# Patient Record
Sex: FEMALE | Race: WHITE | Employment: FULL TIME | URBAN - METROPOLITAN AREA
[De-identification: names, ages, dates, MRNs, and addresses within clinical notes are randomized per-mention and may not be internally consistent; named-entity substitution may affect disease eponyms.]

---
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
## 2020-07-08 PROBLEM — O26.873 SHORT CERVIX, ANTEPARTUM, THIRD TRIMESTER: Status: ACTIVE | Noted: 2020-07-08

## 2020-07-08 PROBLEM — O26.843 UTERINE SIZE DATE DISCREPANCY PREGNANCY, THIRD TRIMESTER: Status: ACTIVE | Noted: 2020-07-08

## 2020-07-28 PROBLEM — Z34.00 PRIMIGRAVIDA, ANTEPARTUM: Status: ACTIVE | Noted: 2020-07-28

## 2020-07-29 ENCOUNTER — HOSPITAL ENCOUNTER (EMERGENCY)
Age: 20
Discharge: HOME | End: 2020-07-29
Attending: OBSTETRICS & GYNECOLOGY | Admitting: OBSTETRICS & GYNECOLOGY
Payer: MEDICAID

## 2020-07-29 VITALS
TEMPERATURE: 98.9 F | HEART RATE: 94 BPM | DIASTOLIC BLOOD PRESSURE: 71 MMHG | RESPIRATION RATE: 18 BRPM | SYSTOLIC BLOOD PRESSURE: 115 MMHG

## 2020-07-29 LAB — FIBRONECTIN FETAL VAG QL: NEGATIVE

## 2020-07-29 PROCEDURE — 74011250636 HC RX REV CODE- 250/636: Performed by: OBSTETRICS & GYNECOLOGY

## 2020-07-29 PROCEDURE — 82731 ASSAY OF FETAL FIBRONECTIN: CPT

## 2020-07-29 PROCEDURE — 59025 FETAL NON-STRESS TEST: CPT

## 2020-07-29 PROCEDURE — 96372 THER/PROPH/DIAG INJ SC/IM: CPT

## 2020-07-29 RX ORDER — TERBUTALINE SULFATE 1 MG/ML
0.25 INJECTION SUBCUTANEOUS
Status: COMPLETED | OUTPATIENT
Start: 2020-07-29 | End: 2020-07-29

## 2020-07-29 RX ADMIN — TERBUTALINE SULFATE 0.25 MG: 1 INJECTION, SOLUTION SUBCUTANEOUS at 13:10

## 2020-07-29 NOTE — DISCHARGE INSTRUCTIONS
Patient Education        Pregnancy Precautions: Care Instructions  Your Care Instructions     There is no sure way to prevent labor before your due date ( labor) or to prevent most other pregnancy problems. But there are things you can do to increase your chances of a healthy pregnancy. Go to your appointments, follow your doctor's advice, and take good care of yourself. Eat well, and exercise (if your doctor agrees). And make sure to drink plenty of water. Follow-up care is a key part of your treatment and safety. Be sure to make and go to all appointments, and call your doctor if you are having problems. It's also a good idea to know your test results and keep a list of the medicines you take. How can you care for yourself at home? · Make sure you go to your prenatal appointments. At each visit, your doctor will check your blood pressure. Your doctor will also check to see if you have protein in your urine. High blood pressure and protein in urine are signs of preeclampsia. This condition can be dangerous for you and your baby. · Drink plenty of fluids, enough so that your urine is light yellow or clear like water. Dehydration can cause contractions. If you have kidney, heart, or liver disease and have to limit fluids, talk with your doctor before you increase the amount of fluids you drink. · Tell your doctor right away if you notice any symptoms of an infection, such as:  ? Burning when you urinate. ? A foul-smelling discharge from your vagina. ? Vaginal itching. ? Unexplained fever. ? Unusual pain or soreness in your uterus or lower belly. · Eat a balanced diet. Include plenty of foods that are high in calcium and iron. ? Foods high in calcium include milk, cheese, yogurt, almonds, and broccoli. ? Foods high in iron include red meat, shellfish, poultry, eggs, beans, raisins, whole-grain bread, and leafy green vegetables. · Do not smoke.  If you need help quitting, talk to your doctor about stop-smoking programs and medicines. These can increase your chances of quitting for good. · Do not drink alcohol or use illegal drugs. · Follow your doctor's directions about activity. Your doctor will let you know how much, if any, exercise you can do. · Ask your doctor if you can have sex. If you are at risk for early labor, your doctor may ask you to not have sex. · Take care to prevent falls. During pregnancy, your joints are loose, and your balance is off. Sports such as bicycling, skiing, or in-line skating can increase your risk of falling. And don't ride horses or motorcycles, dive, water ski, scuba dive, or parachute jump while you are pregnant. · Avoid getting very hot. Do not use saunas or hot tubs. Avoid staying out in the sun in hot weather for long periods. Take acetaminophen (Tylenol) to lower a high fever. · Do not take any over-the-counter or herbal medicines or supplements without talking to your doctor or pharmacist first.  When should you call for help? Call 911 anytime you think you may need emergency care. For example, call if:  · You passed out (lost consciousness). · You have a seizure. · You have severe vaginal bleeding. · You have severe pain in your belly or pelvis. · You have had fluid gushing or leaking from your vagina and you know or think the umbilical cord is bulging into your vagina. If this happens, immediately get down on your knees so your rear end (buttocks) is higher than your head. This will decrease the pressure on the cord until help arrives. Call your doctor now or seek immediate medical care if:  · You have signs of preeclampsia, such as:  ? Sudden swelling of your face, hands, or feet. ? New vision problems (such as dimness, blurring, or seeing spots). ? A severe headache. · You have any vaginal bleeding. · You have belly pain or cramping. · You have a fever. · You have had regular contractions (with or without pain) for an hour.  This means that you have 8 or more within 1 hour or 4 or more in 20 minutes after you change your position and drink fluids. · You have a sudden release of fluid from your vagina. · You have low back pain or pelvic pressure that does not go away. · You notice that your baby has stopped moving or is moving much less than normal.  Watch closely for changes in your health, and be sure to contact your doctor if you have any problems. Where can you learn more? Go to http://www.fish.com/  Enter Y951 in the search box to learn more about \"Pregnancy Precautions: Care Instructions. \"  Current as of: February 11, 2020               Content Version: 12.5  © 7539-6359 [employer]wise, LMN-1. Care instructions adapted under license by [employer] (which disclaims liability or warranty for this information). If you have questions about a medical condition or this instruction, always ask your healthcare professional. [address] any warranty or liability for your use of this information.

## 2020-07-29 NOTE — PROGRESS NOTES
FFN was negative. Patient discharged home per MD order. Discharge instructions completed and patient verbalized understanding. Questions encouraged. Stable at discharge.

## 2020-07-29 NOTE — PROGRESS NOTES
13:10  Medication Given  terbutaline (BRETHINE) injection 0.25 mg -  Dose: 0.25 mg ; Route: SubCUTAneous ;  Site: Left Arm ; Scheduled Time: 1300

## 2020-08-05 ENCOUNTER — HOSPITAL ENCOUNTER (EMERGENCY)
Age: 20
Discharge: HOME | End: 2020-08-05
Attending: OBSTETRICS & GYNECOLOGY | Admitting: OBSTETRICS & GYNECOLOGY
Payer: MEDICAID

## 2020-08-05 PROCEDURE — 74011250636 HC RX REV CODE- 250/636: Performed by: OBSTETRICS & GYNECOLOGY

## 2020-08-05 PROCEDURE — 96372 THER/PROPH/DIAG INJ SC/IM: CPT

## 2020-08-05 RX ORDER — BETAMETHASONE SODIUM PHOSPHATE AND BETAMETHASONE ACETATE 3; 3 MG/ML; MG/ML
12 INJECTION, SUSPENSION INTRA-ARTICULAR; INTRALESIONAL; INTRAMUSCULAR; SOFT TISSUE
Status: COMPLETED | OUTPATIENT
Start: 2020-08-05 | End: 2020-08-05

## 2020-08-05 RX ADMIN — BETAMETHASONE SODIUM PHOSPHATE AND BETAMETHASONE ACETATE 12 MG: 3; 3 INJECTION, SUSPENSION INTRA-ARTICULAR; INTRALESIONAL; INTRAMUSCULAR at 15:47

## 2020-08-05 NOTE — PROGRESS NOTES
Pt to [facility] for celestone injection per Dr. Ronna Briones. 1547-Celestone injection given. Pt tolerated well. Instructed to return tomorrow this same time for second injection. Pt verbalized understanding. 1553-Pt discharged home in stable condition.

## 2020-08-06 ENCOUNTER — HOSPITAL ENCOUNTER (EMERGENCY)
Age: 20
Discharge: HOME | End: 2020-08-06
Attending: OBSTETRICS & GYNECOLOGY | Admitting: OBSTETRICS & GYNECOLOGY
Payer: MEDICAID

## 2020-08-06 PROCEDURE — 74011250636 HC RX REV CODE- 250/636: Performed by: OBSTETRICS & GYNECOLOGY

## 2020-08-06 PROCEDURE — 96372 THER/PROPH/DIAG INJ SC/IM: CPT

## 2020-08-06 RX ORDER — BETAMETHASONE SODIUM PHOSPHATE AND BETAMETHASONE ACETATE 3; 3 MG/ML; MG/ML
12 INJECTION, SUSPENSION INTRA-ARTICULAR; INTRALESIONAL; INTRAMUSCULAR; SOFT TISSUE ONCE
Status: COMPLETED | OUTPATIENT
Start: 2020-08-06 | End: 2020-08-06

## 2020-08-06 RX ADMIN — BETAMETHASONE SODIUM PHOSPHATE AND BETAMETHASONE ACETATE 12 MG: 3; 3 INJECTION, SUSPENSION INTRA-ARTICULAR; INTRALESIONAL; INTRAMUSCULAR at 16:19

## 2020-08-06 NOTE — PROGRESS NOTES
1614-Pt to [facility] for second celestone injection. 1619-Celestone injection given. Pt tolerated well.     1622-Pt discharged home in stable condition.

## 2020-08-20 PROBLEM — O34.80 POLYCYSTIC OVARY AFFECTING PREGNANCY, ANTEPARTUM: Status: ACTIVE | Noted: 2018-07-12

## 2020-08-20 PROBLEM — O09.93 HIGH-RISK PREGNANCY IN THIRD TRIMESTER: Status: ACTIVE | Noted: 2020-07-28

## 2020-08-20 PROBLEM — O36.5930 POOR FETAL GROWTH AFFECTING MANAGEMENT OF MOTHER IN THIRD TRIMESTER: Status: ACTIVE | Noted: 2020-08-20

## 2020-08-26 PROBLEM — O36.5990 PREGNANCY AFFECTED BY FETAL GROWTH RESTRICTION: Status: ACTIVE | Noted: 2020-08-20

## 2020-09-02 ENCOUNTER — HOSPITAL ENCOUNTER (INPATIENT)
Age: 20
LOS: 3 days | Discharge: HOME | End: 2020-09-05
Attending: OBSTETRICS & GYNECOLOGY | Admitting: OBSTETRICS & GYNECOLOGY
Payer: COMMERCIAL

## 2020-09-02 ENCOUNTER — ANESTHESIA EVENT (OUTPATIENT)
Dept: LABOR AND DELIVERY | Age: 20
End: 2020-09-02
Payer: COMMERCIAL

## 2020-09-02 ENCOUNTER — ANESTHESIA (OUTPATIENT)
Dept: LABOR AND DELIVERY | Age: 20
End: 2020-09-02
Payer: COMMERCIAL

## 2020-09-02 PROBLEM — Z3A.38 38 WEEKS GESTATION OF PREGNANCY: Status: ACTIVE | Noted: 2020-09-02

## 2020-09-02 PROBLEM — Z34.90 ENCOUNTER FOR PLANNED INDUCTION OF LABOR: Status: ACTIVE | Noted: 2020-09-02

## 2020-09-02 PROBLEM — O36.5990 IUGR (INTRAUTERINE GROWTH RESTRICTION) AFFECTING CARE OF MOTHER: Status: ACTIVE | Noted: 2020-09-02

## 2020-09-02 LAB
ABO + RH BLD: NORMAL
BLOOD GROUP ANTIBODIES SERPL: NORMAL
ERYTHROCYTE [DISTWIDTH] IN BLOOD BY AUTOMATED COUNT: 12.5 % (ref 11.9–14.6)
HCT VFR BLD AUTO: 36 % (ref 35.8–46.3)
HGB BLD-MCNC: 12.4 G/DL (ref 11.7–15.4)
MCH RBC QN AUTO: 32.5 PG (ref 26.1–32.9)
MCHC RBC AUTO-ENTMCNC: 34.4 G/DL (ref 31.4–35)
MCV RBC AUTO: 94.2 FL (ref 79.6–97.8)
NRBC # BLD: 0 K/UL (ref 0–0.2)
PLATELET # BLD AUTO: 150 K/UL (ref 150–450)
PMV BLD AUTO: 12.7 FL (ref 9.4–12.3)
RBC # BLD AUTO: 3.82 M/UL (ref 4.05–5.2)
SPECIMEN EXP DATE BLD: NORMAL
WBC # BLD AUTO: 11.8 K/UL (ref 4.3–11.1)

## 2020-09-02 PROCEDURE — 10907ZC DRAINAGE OF AMNIOTIC FLUID, THERAPEUTIC FROM PRODUCTS OF CONCEPTION, VIA NATURAL OR ARTIFICIAL OPENING: ICD-10-PCS | Performed by: OBSTETRICS & GYNECOLOGY

## 2020-09-02 PROCEDURE — 74011250636 HC RX REV CODE- 250/636: Performed by: REGISTERED NURSE

## 2020-09-02 PROCEDURE — 86900 BLOOD TYPING SEROLOGIC ABO: CPT

## 2020-09-02 PROCEDURE — A4300 CATH IMPL VASC ACCESS PORTAL: HCPCS | Performed by: ANESTHESIOLOGY

## 2020-09-02 PROCEDURE — 36415 COLL VENOUS BLD VENIPUNCTURE: CPT

## 2020-09-02 PROCEDURE — 99283 EMERGENCY DEPT VISIT LOW MDM: CPT | Performed by: OBSTETRICS & GYNECOLOGY

## 2020-09-02 PROCEDURE — 85027 COMPLETE CBC AUTOMATED: CPT

## 2020-09-02 PROCEDURE — 77030014125 HC TY EPDRL BBMI -B: Performed by: ANESTHESIOLOGY

## 2020-09-02 PROCEDURE — 74011250636 HC RX REV CODE- 250/636: Performed by: OBSTETRICS & GYNECOLOGY

## 2020-09-02 PROCEDURE — 65270000029 HC RM PRIVATE

## 2020-09-02 RX ORDER — LIDOCAINE HYDROCHLORIDE 20 MG/ML
JELLY TOPICAL
Status: DISCONTINUED | OUTPATIENT
Start: 2020-09-02 | End: 2020-09-03 | Stop reason: HOSPADM

## 2020-09-02 RX ORDER — BUTORPHANOL TARTRATE 2 MG/ML
1 INJECTION INTRAMUSCULAR; INTRAVENOUS
Status: DISCONTINUED | OUTPATIENT
Start: 2020-09-02 | End: 2020-09-03 | Stop reason: HOSPADM

## 2020-09-02 RX ORDER — ROPIVACAINE HYDROCHLORIDE 2 MG/ML
INJECTION, SOLUTION EPIDURAL; INFILTRATION; PERINEURAL
Status: DISCONTINUED | OUTPATIENT
Start: 2020-09-02 | End: 2020-09-03 | Stop reason: HOSPADM

## 2020-09-02 RX ORDER — SODIUM CHLORIDE 0.9 % (FLUSH) 0.9 %
5-40 SYRINGE (ML) INJECTION AS NEEDED
Status: DISCONTINUED | OUTPATIENT
Start: 2020-09-02 | End: 2020-09-03

## 2020-09-02 RX ORDER — SODIUM CHLORIDE 0.9 % (FLUSH) 0.9 %
5-40 SYRINGE (ML) INJECTION EVERY 8 HOURS
Status: DISCONTINUED | OUTPATIENT
Start: 2020-09-02 | End: 2020-09-03

## 2020-09-02 RX ORDER — LIDOCAINE HYDROCHLORIDE 10 MG/ML
1 INJECTION INFILTRATION; PERINEURAL
Status: DISCONTINUED | OUTPATIENT
Start: 2020-09-02 | End: 2020-09-03 | Stop reason: HOSPADM

## 2020-09-02 RX ORDER — ROPIVACAINE HYDROCHLORIDE 2 MG/ML
INJECTION, SOLUTION EPIDURAL; INFILTRATION; PERINEURAL AS NEEDED
Status: DISCONTINUED | OUTPATIENT
Start: 2020-09-02 | End: 2020-09-03 | Stop reason: HOSPADM

## 2020-09-02 RX ORDER — DEXTROSE, SODIUM CHLORIDE, SODIUM LACTATE, POTASSIUM CHLORIDE, AND CALCIUM CHLORIDE 5; .6; .31; .03; .02 G/100ML; G/100ML; G/100ML; G/100ML; G/100ML
125 INJECTION, SOLUTION INTRAVENOUS CONTINUOUS
Status: DISCONTINUED | OUTPATIENT
Start: 2020-09-02 | End: 2020-09-03

## 2020-09-02 RX ORDER — OXYTOCIN/RINGER'S LACTATE 30/500 ML
250 PLASTIC BAG, INJECTION (ML) INTRAVENOUS ONCE
Status: DISCONTINUED | OUTPATIENT
Start: 2020-09-02 | End: 2020-09-03

## 2020-09-02 RX ORDER — OXYTOCIN/RINGER'S LACTATE 30/500 ML
0-25 PLASTIC BAG, INJECTION (ML) INTRAVENOUS
Status: DISCONTINUED | OUTPATIENT
Start: 2020-09-02 | End: 2020-09-03 | Stop reason: HOSPADM

## 2020-09-02 RX ORDER — MINERAL OIL
120 OIL (ML) ORAL
Status: COMPLETED | OUTPATIENT
Start: 2020-09-02 | End: 2020-09-03

## 2020-09-02 RX ADMIN — Medication 8 ML: at 20:11

## 2020-09-02 RX ADMIN — SODIUM CHLORIDE, SODIUM LACTATE, POTASSIUM CHLORIDE, CALCIUM CHLORIDE, AND DEXTROSE MONOHYDRATE 125 ML/HR: 600; 310; 30; 20; 5 INJECTION, SOLUTION INTRAVENOUS at 18:10

## 2020-09-02 RX ADMIN — ROPIVACAINE HYDROCHLORIDE 10 ML/HR: 2 INJECTION, SOLUTION EPIDURAL; INFILTRATION at 20:11

## 2020-09-02 RX ADMIN — Medication 2 MILLI-UNITS/MIN: at 18:20

## 2020-09-02 NOTE — PROGRESS NOTES
Patient declined preadmission call.  Patient states she is very uncomfortable with her contractions and is headed to the hospital.

## 2020-09-02 NOTE — PROGRESS NOTES
Pt presents to triage with complaint of contractions. Denies LOF, VB, endorses good fetal movement. Denies any other complaints, rates pain as 7/10.

## 2020-09-02 NOTE — PROGRESS NOTES
Dr. Christina Real called and given SBAR and SVE update. Dr. William Mac to bedside. POC is for AROM by provider within one hour.

## 2020-09-02 NOTE — H&P
History & Physical    Name: Celestina Salas MRN: [id number]  SSN: [id number]    YOB: 2000  Age: 23 y.o. Sex: female      Chief Complaint   Patient presents with    Contractions       Subjective:     Estimated Date of Delivery: 20  OB History    Para Term  AB Living   1 0 0 0 0 0   SAB TAB Ectopic Molar Multiple Live Births   0 0 0 0 0 0      # Outcome Date GA Lbr Abel/2nd Weight Sex Delivery Anes PTL Lv   1 Current                Ms. Joan Andrews is seen with pregnancy at 37w6d for active labor. Prenatal course was complicated by iugr and is scheduled for induction tomorrow. the patients states that the baby moves as usual   Please see prenatal records for details. Past Medical History:   Diagnosis Date    PCOS (polycystic ovarian syndrome)      Past Surgical History:   Procedure Laterality Date    HX TONSILLECTOMY      HX WISDOM TEETH EXTRACTION       Social History     Occupational History    Not on file   Tobacco Use    Smoking status: Never Smoker    Smokeless tobacco: Never Used   Substance and Sexual Activity    Alcohol use: Not Currently    Drug use: Never    Sexual activity: Yes     Partners: Male     Birth control/protection: None     Family History   Problem Relation Age of Onset    Hypertension Father     Diabetes Paternal Grandmother     Breast Cancer Neg Hx     Colon Cancer Neg Hx     Ovarian Cancer Neg Hx        Allergies   Allergen Reactions    Sulfa (Sulfonamide Antibiotics) Hives     Prior to Admission medications    Medication Sig Start Date End Date Taking? Authorizing Provider   calcium-cholecalciferol, d3, (CALCIUM 600 + D) 600-125 mg-unit tab Take  by mouth. Provider, Historical   [id number]/RSIE autumn/folic/dha (PRENATAL DHA+COMPLETE PRENATAL PO) Take  by mouth.     Provider, Historical        Review of Systems:  Constitutional:No headache, fever  Cardiac:   No chest pain      Resp: No cough or shortness of breath     GI:   No nausea/vomiting, diarrhea, abdominal pain    :   No dysuria  Neuro:     No vision changes, headache      Objective:     Vitals:  Vitals:    20 1658 20 1721   BP: 133/80    Pulse: (!) 101    Resp: 18    Temp: 98.7 °F (37.1 °C)    Weight:  73 kg (161 lb)   Height:  5' 7\" (1.702 m)        Physical Exam:  Patient without distress. Heart: Regular rate and rhythm  Lung: clear to auscultation throughout lung fields, no wheezes, no rales, no rhonchi and normal respiratory effort  Back: costovertebral angle tenderness absent  Abdomen: soft, nontender, without guarding, without rebound  Fundus: soft and non tender  Cervical Exam: 5 cm dilated    80% effaced    0 station    Presenting Part: cephalic  Lower Extremities:  - Edema No  Membranes:  Intact  Fetal Heart Rate tracing: Category 1  Uterine contractions: irregular, every 4-6 minutes    Prenatal Labs:   gbs neg    Assessment/Plan:     Ms. Whitney Oliva is a  seen with pregnancy at 37w6d for active labor, known fetal growth restriction, reassuring fhr tracing.          Plan:     Admit for labor management    Patient discussed with Dr. Betty Cabral By:  Jacki Sahu MD     2020

## 2020-09-03 LAB
ARTERIAL PATENCY WRIST A: ABNORMAL
ARTERIAL PATENCY WRIST A: ABNORMAL
BASE DEFICIT BLD-SCNC: 6 MMOL/L
BASE DEFICIT BLD-SCNC: 6 MMOL/L
BDY SITE: ABNORMAL
BDY SITE: ABNORMAL
CA-I BLD-MCNC: 1.46 MMOL/L (ref 1.12–1.32)
CA-I BLD-MCNC: 1.48 MMOL/L (ref 1.12–1.32)
COLLECT TIME,HTIME: 207
COLLECT TIME,HTIME: 207
GAS FLOW.O2 O2 DELIVERY SYS: ABNORMAL L/MIN
GAS FLOW.O2 O2 DELIVERY SYS: ABNORMAL L/MIN
GLUCOSE BLD STRIP.AUTO-MCNC: 66 MG/DL (ref 65–100)
GLUCOSE BLD STRIP.AUTO-MCNC: 75 MG/DL (ref 65–100)
HCO3 BLD-SCNC: 21.7 MMOL/L (ref 22–26)
HCO3 BLD-SCNC: 23.9 MMOL/L (ref 22–26)
O2/TOTAL GAS SETTING VFR VENT: 21 %
O2/TOTAL GAS SETTING VFR VENT: 21 %
PCO2 BLD: 48.4 MMHG (ref 35–45)
PCO2 BLD: 61.1 MMHG (ref 35–45)
PH BLD: 7.2 [PH] (ref 7.35–7.45)
PH BLD: 7.26 [PH] (ref 7.35–7.45)
PO2 BLD: 10 MMHG (ref 75–100)
PO2 BLD: 19 MMHG (ref 75–100)
POTASSIUM BLD-SCNC: 4.4 MMOL/L (ref 3.5–5.1)
POTASSIUM BLD-SCNC: 4.7 MMOL/L (ref 3.5–5.1)
SAO2 % BLD: 24 % (ref 95–98)
SAO2 % BLD: 7 % (ref 95–98)
SERVICE CMNT-IMP: ABNORMAL
SERVICE CMNT-IMP: ABNORMAL
SODIUM BLD-SCNC: 138 MMOL/L (ref 136–145)
SODIUM BLD-SCNC: 139 MMOL/L (ref 136–145)
SPECIMEN TYPE: ABNORMAL
SPECIMEN TYPE: ABNORMAL

## 2020-09-03 PROCEDURE — 65270000029 HC RM PRIVATE

## 2020-09-03 PROCEDURE — 76060000078 HC EPIDURAL ANESTHESIA

## 2020-09-03 PROCEDURE — 82803 BLOOD GASES ANY COMBINATION: CPT

## 2020-09-03 PROCEDURE — 75410000002 HC LABOR FEE PER 1 HR

## 2020-09-03 PROCEDURE — 77030018846 HC SOL IRR STRL H20 ICUM -A

## 2020-09-03 PROCEDURE — 74011250637 HC RX REV CODE- 250/637: Performed by: OBSTETRICS & GYNECOLOGY

## 2020-09-03 PROCEDURE — 82947 ASSAY GLUCOSE BLOOD QUANT: CPT

## 2020-09-03 PROCEDURE — 75410000003 HC RECOV DEL/VAG/CSECN EA 0.5 HR

## 2020-09-03 PROCEDURE — 75410000000 HC DELIVERY VAGINAL/SINGLE

## 2020-09-03 RX ORDER — DOCUSATE SODIUM 100 MG/1
100 CAPSULE, LIQUID FILLED ORAL 2 TIMES DAILY
Status: DISCONTINUED | OUTPATIENT
Start: 2020-09-03 | End: 2020-09-05 | Stop reason: HOSPADM

## 2020-09-03 RX ORDER — IBUPROFEN 800 MG/1
800 TABLET ORAL
Status: DISCONTINUED | OUTPATIENT
Start: 2020-09-03 | End: 2020-09-05 | Stop reason: HOSPADM

## 2020-09-03 RX ORDER — HYDROCODONE BITARTRATE AND ACETAMINOPHEN 5; 325 MG/1; MG/1
1-2 TABLET ORAL
Status: DISCONTINUED | OUTPATIENT
Start: 2020-09-03 | End: 2020-09-05 | Stop reason: HOSPADM

## 2020-09-03 RX ORDER — NALOXONE HYDROCHLORIDE 0.4 MG/ML
0.4 INJECTION, SOLUTION INTRAMUSCULAR; INTRAVENOUS; SUBCUTANEOUS AS NEEDED
Status: DISCONTINUED | OUTPATIENT
Start: 2020-09-03 | End: 2020-09-05 | Stop reason: HOSPADM

## 2020-09-03 RX ORDER — SIMETHICONE 80 MG
80 TABLET,CHEWABLE ORAL
Status: DISCONTINUED | OUTPATIENT
Start: 2020-09-03 | End: 2020-09-05 | Stop reason: HOSPADM

## 2020-09-03 RX ORDER — DIPHENHYDRAMINE HCL 25 MG
25-50 CAPSULE ORAL
Status: DISCONTINUED | OUTPATIENT
Start: 2020-09-03 | End: 2020-09-05 | Stop reason: HOSPADM

## 2020-09-03 RX ORDER — ZOLPIDEM TARTRATE 5 MG/1
10 TABLET ORAL
Status: DISCONTINUED | OUTPATIENT
Start: 2020-09-03 | End: 2020-09-05 | Stop reason: HOSPADM

## 2020-09-03 RX ADMIN — MINERAL OIL 120 ML: 1000 LIQUID ORAL at 02:00

## 2020-09-03 NOTE — PROGRESS NOTES
Safety Teaching reviewed:   1. Hand hygiene prior to handling the infant. 2. Use of bulb syringe  3. Bracelets with matching numbers are placed on mother and infant  3. An infant security tag  [facility]) is placed on the infant's ankle and monitored  5. All OB nurses wear pink Employee badges - do not give your baby to anyone without proper identification. 6. Never leave the baby alone in the room. 7. The infant should be placed on their back to sleep. on a firm mattress. No toys should be placed in the crib. (safe sleep video offered to view)  8. Never shake the baby (video offered to view)  9. Infant fall prevention - do not sleep with the baby, and place the baby in the crib while ambulating. 8. Mother and Baby Care booklet given to Mother. 11. Bulb syringe at bedside.

## 2020-09-03 NOTE — LACTATION NOTE
This note was copied from a baby's chart. Lactation visit. Baby still in first 24 hours of life. Has latched well several times, sleepy earlier today. Recently had first bath. Has voided, no stool yet. Mom attempting now, baby sleepy and in cradle hold. Poor support. Assisted mom in cross cradle hold on left breast and football hold on right breast. Small, short nipples but juan well. Colostrum copiously expressed with hand expression. Reviewed breast compression to help baby latch deeply. Baby eager at the breast and latches well. Fed well on both breasts. 30 minutes total feeding time. Doing well. Reviewed feeding expectations. Feed on demand. Wake as needed. All questions answered.

## 2020-09-03 NOTE — PROGRESS NOTES
Patient comfortable with epidural.     Patient Vitals for the past 4 hrs:   Temp Pulse Resp BP   09/02/20 2217 -- 80 -- 105/57   09/02/20 2202 -- 76 -- 108/60   09/02/20 2133 98.4 °F (36.9 °C) 80 17 102/62   09/02/20 2118 -- 86 -- 102/64   09/02/20 2102 -- -- -- 97/71   09/02/20 2044 -- 85 -- 130/58   09/02/20 2039 -- 82 -- 107/67   09/02/20 2034 -- 90 -- 98/59   09/02/20 2028 -- 88 -- 101/60   09/02/20 2025 -- 86 -- 101/62   09/02/20 2022 -- 93 -- 102/59   09/02/20 2019 -- 98 -- 111/61   09/02/20 2016 -- 92 -- 119/66   09/02/20 2013 -- 88 -- 123/56   09/02/20 2009 -- (!) 104 -- 134/64   09/02/20 2008 -- (!) 111 -- 131/62   09/02/20 2007 -- (!) 102 -- 140/73   09/02/20 1959 -- (!) 113 -- 128/85   09/02/20 1944 -- 100 -- 145/90   09/02/20 1929 -- 86 -- 128/87   09/02/20 1913 98.5 °F (36.9 °C) 88 19 127/89   09/02/20 1857 -- 93 -- 123/85   09/02/20 1842 -- 86 -- 125/83       FHR: 130s with good variability ; occasional mild/quick variables. No late decels.    Contractions: every 2-4 minutes  Cx: 7/100/-2  AROM: blood seen; could not tell if meconium present    Jaime Santa MD  10:42 PM

## 2020-09-03 NOTE — LACTATION NOTE

## 2020-09-03 NOTE — PROGRESS NOTES
SBAR OUT Report: Mother    Verbal report given to Fabby Dears RN (full name & credentials) on this patient, who is now being transferred to MIU (unit) for routine progression of care. The patient is not wearing a green \"Anesthesia-Duramorph\" band. Report consisted of patient's Situation, Background, Assessment and Recommendations (SBAR). [place] ID bands were compared with the identification form, and verified with the patient and receiving nurse. Information from the SBAR, Intake/Output and MAR and the [place] Report was reviewed with the receiving nurse; opportunity for questions and clarification provided.

## 2020-09-03 NOTE — PROGRESS NOTES
FHR with more variables and wider after AROM. Consent for IUPC received so it was placed without difficulty.    Bolus with 600 cc then 180cc /hr  Pitocin dc'd temporarily    Sherley Choudhury MD  11:08 PM

## 2020-09-03 NOTE — ANESTHESIA POSTPROCEDURE EVALUATION
* No procedures listed *.    epidural    Anesthesia Post Evaluation      Multimodal analgesia: multimodal analgesia used between 6 hours prior to anesthesia start to PACU discharge  Patient location during evaluation: bedside  Patient participation: complete - patient participated  Level of consciousness: awake and responsive to light touch  Pain management: adequate  Airway patency: patent  Anesthetic complications: no  Cardiovascular status: acceptable, hemodynamically stable, blood pressure returned to baseline and stable  Respiratory status: acceptable, unassisted, spontaneous ventilation and nonlabored ventilation  Hydration status: acceptable        INITIAL Post-op Vital signs: No vitals data found for the desired time range.

## 2020-09-03 NOTE — ANESTHESIA PREPROCEDURE EVALUATION
Relevant Problems   No relevant active problems       Anesthetic History   No history of anesthetic complications            Review of Systems / Medical History  Patient summary reviewed and pertinent labs reviewed    Pulmonary  Within defined limits                 Neuro/Psych   Within defined limits           Cardiovascular                  Exercise tolerance: >4 METS     GI/Hepatic/Renal  Within defined limits              Endo/Other  Within defined limits           Other Findings              Physical Exam    Airway  Mallampati: II  TM Distance: 4 - 6 cm  Neck ROM: normal range of motion   Mouth opening: Normal     Cardiovascular    Rhythm: regular  Rate: normal      Pertinent negatives: No murmur   Dental  No notable dental hx       Pulmonary  Breath sounds clear to auscultation               Abdominal         Other Findings            Anesthetic Plan    ASA: 1  Anesthesia type: epidural            Anesthetic plan and risks discussed with: Patient

## 2020-09-03 NOTE — LACTATION NOTE
Assisted mom with breastfeeding. Infant un swaddle placed on L breast in cradle. Infant too sleepy to receive latch. After several attempts infant woke up mom expressed a drop of colostrum and infant started sucking. Infant been on L breast for 2 minutes and in progress. Family at bedside. Mother instructed to call out if need assistance. Mother verbalized understanding.

## 2020-09-03 NOTE — PROGRESS NOTES
Dr. Erika Schroeder and Karen Ventura CRNA at bedside for epidrual placement .  1956    Patient set up at bedside supported by RN and significant other 1956    Time out performed with anesthesia interview 2001     Cath in at 2005    Test dose  At  2007  Neg reaction    Dosing at 2010    Patient helped to lying postion at   2010  Tilted left

## 2020-09-03 NOTE — PROGRESS NOTES
SBAR IN Report: Mother    Verbal report received from Candis Acosta (full name & credentials) on this patient, who is now being transferred from L&D (unit) for routine progression of care. The patient is not wearing a green \"Anesthesia-Duramorph\" band. Report consisted of patient's Situation, Background, Assessment and Recommendations (SBAR). [place] ID bands were compared with the identification form, and verified with the patient and transferring nurse. Information from the SBAR and Kardex and the [address] [facility] Report was reviewed with the transferring nurse; opportunity for questions and clarification provided.

## 2020-09-03 NOTE — L&D DELIVERY NOTE
Delivery Summary    Patient: Aj Vázquez MRN: [id number]  SSN: [id number]    YOB: 2000  Age: 23 y.o. Sex: female       Information for the patient's :  Trey Rueda [[id number]]       Labor Events:    Labor: No    Steroids: None   Cervical Ripening Date/Time:       Cervical Ripening Type: None   Antibiotics During Labor: No   Rupture Identifier:      Rupture Date/Time: 2020 10:38 PM   Rupture Type: AROM   Amniotic Fluid Volume: Scant    Amniotic Fluid Description:      Amniotic Fluid Odor: None    Induction: None       Induction Date/Time: 2020 6:20 PM    Indications for Induction:      Augmentation: AROM; Oxytocin   Augmentation Date/Time: 202010:38 PM   Indications for Augmentation: Ineffective Contraction Pattern   Labor complications: None       Additional complications:        Delivery Events:  Indications For Episiotomy:     Episiotomy: None   Perineal Laceration(s): None   Repaired:     Periurethral Laceration Location:      Repaired:     Labial Laceration Location:     Repaired:     Sulcal Laceration Location:     Repaired:     Vaginal Laceration Location:     Repaired:     Cervical Laceration Location:     Repaired:     Repair Suture: None   Number of Repair Packets:     Estimated Blood Loss (ml):  ml   Quantitative Blood Loss (ml)                Delivery Date: 9/3/2020    Delivery Time: 2:07 AM  Delivery Type: Vaginal, Spontaneous  Sex:  Female    Gestational Age: 38w0d   Delivery Clinician:  Daya Summers  Living Status: Living   Delivery Location: L&D 434          APGARS  One minute Five minutes Ten minutes   Skin color: 0   1        Heart rate: 2   2        Grimace: 2   2        Muscle tone: 1   2        Breathin   2        Totals: 6   9            Presentation: Vertex    Position:   Occiput Anterior  Resuscitation Method:  Tactile Stimulation;Suctioning-bulb     Meconium Stained:        Cord Information: 3 Vessels  Complications: Nuchal Cord With Compressions  Cord around: head  Delayed cord clamping? No  Cord clamped date/time:   Disposition of Cord Blood: Lab    Blood Gases Sent?: Yes    Placenta:  Date/Time: 9/3/2020  2:15 AM  Removal: Spontaneous      Appearance: Normal;Intact     [place] Measurements:  Birth Weight: 5 lb 15.9 oz (2.72 kg)      Birth Length: 47 cm      Head Circumference: 34.5 cm      Chest Circumference: 32 cm     Abdominal Girth: Other Providers:   Colton DUMONT;JOSE BOYLE;DEV LEONG;SACHA PAVON;JUDY LLANOS;EDILBERTO OCAMPO;GHASSAN BAI;Maurice LLANOS, Obstetrician;Primary Nurse;Primary [place] Nurse;Neonatologist;Scrub Tech;Respiratory Therapist;Charge Nurse;Scrub Tech           Group B Strep: No results found for: GRBSEXT, GRBSEXT  Information for the patient's :  Jose Luis Gamez [[id number]]   No results found for: Chauncey Dorita, PCTDIG, BILI, ABORHEXT, ABORH        Delivery Note                 Procedure:  Patient became completely dilated and pushed. Episiotomy was not performed. Patient delivered head. Mouth and nares suctioned on the perineum with bulb syringe. Tight nuchal cord x 1 was doubly clamped and cut. Shoulders delivered without any evidence of dystocia. Baby delivered in the bed, was dried. The cord was clamped closer to the umbilicus. Cord pH and cord blood was obtained. The baby was taken to bedside warmer for further evaluation due to initial apgar of 6. NICU was called. The perineum was examined and intact. The placenta was delivered spontaneously. It was examined. It was intact with three vessels. Mom and baby are doing well.       Arterial pH: 7.20     Dhruv Burton MD  9/3/2020  2:28 AM

## 2020-09-03 NOTE — ANESTHESIA PROCEDURE NOTES
Epidural Block    Start time: 9/2/2020 8:01 PM  End time: 9/2/2020 8:05 PM  Performed by: Ave Deutsch MD  Authorized by: Ave Deutsch MD     Pre-Procedure  Indication: labor epidural    Preanesthetic Checklist: patient identified, risks and benefits discussed, anesthesia consent, patient being monitored, timeout performed and anesthesia consent    Timeout Time: 20:01        Epidural:   Patient position:  Seated  Prep region:  Lumbar  Prep: Chlorhexidine    Location:  L3-4    Needle and Epidural Catheter:   Needle Type:  Tuohy  Needle Gauge:  18 G  Injection Technique:  Loss of resistance using saline  Attempts:  1  Catheter Size:  19 G  Catheter at Skin Depth (cm):  9  Depth in Epidural Space (cm):  4  Events: no blood with aspiration, no cerebrospinal fluid with aspiration, no paresthesia and negative aspiration test    Test Dose:  Negative    Assessment:   Catheter Secured:  Tegaderm and tape  Insertion:  Uncomplicated  Patient tolerance:  Patient tolerated the procedure well with no immediate complications  RADHA @ 5cm, left at 9cm skin, 4cm in space. Discussed the risks and benefits of epidural placement and use with patient including (but not limited to) the risk of wet tap and spinal headache, inadequate analgesia, need for removal/replacement of epidural, and hypotension. Discussed the remote risk of uncontrolled bleeding or infection requiring an operation to remedy. After the patient agreed to proceed, I ensured the patient had no contraindications to labor epidural placement including prohibitive heart defects, hypocoagulation, family or personal history of a bleeding disorder. Epidural placement is described in the block note. Frequent hemodynamic monitoring in the first 30 minutes following initial placement was performed. Patient and RN were instructed to call anytime with any questions.

## 2020-09-04 PROCEDURE — 65270000029 HC RM PRIVATE

## 2020-09-04 NOTE — PROGRESS NOTES
Progress Note                               Patient: Sae Jason MRN: [id number]  SSN: [id number]    YOB: 2000  Age: 23 y.o. Sex: female      Postpartum Day Number 1    Subjective:     Delivery 0207 (9/3)     Patient doing well postpartum without significant complaints. Voiding without difficulty. Patient reports normal lochia. Pain well controlled, voiding on her own without difficulty. Breast feeding. Denies HA, SOB/CP, RUQ pain, Vision changes. Objective:     Patient Vitals for the past 12 hrs:   Temp Pulse Resp BP SpO2   20 0718 98.1 °F (36.7 °C) 90 20 103/65 97 %       Temp (24hrs), Av.2 °F (36.8 °C), Min:98.1 °F (36.7 °C), Max:98.3 °F (36.8 °C)      Physical Exam:    Constitutional: She appears well-developed and well-nourished. No distress. HENT:    Head: Normocephalic and atraumatic. Cardiovascular: RRR  Pulmonary/Chest: CTAB  Abd:  S/NTTP/ND, BS normoactive, fundus firm at umbilicus  Ext:  No c/c/e      Lab/Data Review:  CBC:    Recent Labs     20  1809   WBC 11.8*   HGB 12.4   HCT 36.0        BMP/CMP:  No results for input(s): NA, K, CL, CO2, AGAP, GLU, BUN, CREA, GFRAA, GFRNA, CA, MG, PHOS, ALB, TBIL, CBIL, TP, AP, GLOB, AGRAT, ALT in the last 72 hours. No lab exists for component: SGOT, GPT  GBS: No results found for: GRBSEXT, GRBSEXT  Blood Type:   Lab Results   Component Value Date/Time    ABO/Rh(D) A POSITIVE 2020 06:08 PM        Assessment and Plan:     23 y.o.  ppd# 1 s/p :    1) PP:  Meeing all pp goals, continue routine care; desires to stay overnight.   Anticipate DC home in am.   2) Breast feeding, Rh pos, Rub imm       Signed By: Brenda Larsen MD     2020

## 2020-09-04 NOTE — PROGRESS NOTES
Care Management Interventions  PCP Verified by CM: Yes(Provided PCP list)  Discharge Location  Discharge Placement: Home  Provided Postpartum Depression packet and [facility] Home Visit (currently being conducted telephonically) brochure. Pt is interested; referral will be made.

## 2020-09-04 NOTE — LACTATION NOTE
This note was copied from a baby's chart. In to see mom and infant for follow up. Mom states just finished feeding baby for 20 minutes on right breast and now trying to latch baby on left breast. Got mom repositioned well w/ support in chair. Mom let baby latch, but baby got on very shallow and narrow. Reviewed cross cradle hold w/ her and assisted her in getting baby on deeply and much wider. Mom stated felt much better. Reviewed signs to look for in good latch. Reviewed 2nd 24 hr feeding/output expectations and normalcy of some periods of cluster feeding. Mom feels like she has started to do some long feeds. Reviewed looking for if baby is actively feed on breast, or using her for comfort. Mom continues to feed baby. No c/o pain. Answered her breast feeding questions.  Will follow up in am.

## 2020-09-05 VITALS
HEIGHT: 67 IN | RESPIRATION RATE: 16 BRPM | TEMPERATURE: 98.3 F | WEIGHT: 161 LBS | OXYGEN SATURATION: 95 % | BODY MASS INDEX: 25.27 KG/M2 | HEART RATE: 95 BPM | SYSTOLIC BLOOD PRESSURE: 99 MMHG | DIASTOLIC BLOOD PRESSURE: 60 MMHG

## 2020-09-05 PROCEDURE — 90715 TDAP VACCINE 7 YRS/> IM: CPT | Performed by: OBSTETRICS & GYNECOLOGY

## 2020-09-05 PROCEDURE — 74011250636 HC RX REV CODE- 250/636: Performed by: OBSTETRICS & GYNECOLOGY

## 2020-09-05 RX ORDER — IBUPROFEN 800 MG/1
800 TABLET ORAL
Qty: 90 TAB | Refills: 1 | Status: SHIPPED | OUTPATIENT
Start: 2020-09-05 | End: 2020-10-14

## 2020-09-05 RX ADMIN — TETANUS TOXOID, REDUCED DIPHTHERIA TOXOID AND ACELLULAR PERTUSSIS VACCINE, ADSORBED 0.5 ML: 5; 2.5; 8; 8; 2.5 SUSPENSION INTRAMUSCULAR at 11:32

## 2020-09-05 NOTE — LACTATION NOTE
This note was copied from a baby's chart. Lactation visit. In to check on feeds. Latching and nursing well per mom. Dad changing stool diaper now. Baby latched immediately post diaper change, right breast in cradle hold. Doing well on the breast. Showed mom stimulation techniques to keep baby actively feeding. Mom did pump x 1 last night post nursing. If baby latching well at all feeds, mom doesn't have to pump unless she desires. Does not have a pump at home yet, will call insurance this week to see what pumps she can qualify to get. Pump types discussed. Gave mom a manual pump for home use. Discussed engorgement. Questions answered. Keep feeding on demand. Watch output.

## 2020-09-05 NOTE — DISCHARGE SUMMARY
Obstetrical Discharge Summary     Name: Christiana Ruiz MRN: [id number]  SSN: [id number]    YOB: 2000  Age: 23 y.o. Sex: female      Allergies: Sulfa (sulfonamide antibiotics)    Admit Date: 2020    Discharge Date: 2020     Admitting Physician: Melva Godwin MD     Attending Physician:  Caitlin España MD     * Admission Diagnoses: 38 weeks gestation of pregnancy [Z3A.38];IUGR (intrauterine growth restriction) affecting care of mother [O39.5]; Encounter for planned induction of labor [Z34.90]    * Discharge Diagnoses:   Information for the patient's :  Alex Patrick [[id number]]   Delivery of a 5 lb 15.9 oz (2.72 kg) female infant via Vaginal, Spontaneous on 9/3/2020 at 2:07 AM  by Scheryl Hence. Apgars were 6  and 9 . Additional Diagnoses:   Hospital Problems as of 2020 Date Reviewed: 2020          Codes Class Noted - Resolved POA    IUGR (intrauterine growth restriction) affecting care of mother ICD-10-CM: O36.5990  ICD-9-CM: 656.50  2020 - Present Unknown        38 weeks gestation of pregnancy ICD-10-CM: Z3A.38  ICD-9-CM: V22.2  2020 - Present Unknown        Encounter for planned induction of labor ICD-10-CM: Z34.90  ICD-9-CM: V22.1  2020 - Present Unknown             Lab Results   Component Value Date/Time    ABO/Rh(D) A POSITIVE 2020 06:08 PM    There is no immunization history for the selected administration types on file for this patient. * Procedures:         * Discharge Condition: good    [facility] Course: Normal hospital course following the delivery. * Disposition: Home    Discharge Medications:   Current Discharge Medication List      START taking these medications    Details   ibuprofen (MOTRIN) 800 mg tablet Take 1 Tab by mouth every six (6) hours as needed for Pain.   Qty: 90 Tab, Refills: 1         CONTINUE these medications which have NOT CHANGED    Details   calcium-cholecalciferol, d3, (CALCIUM 600 + D) 600-125 mg-unit tab Take  by mouth.      JCLEEKET36/OCBA autumn/folic/dha (PRENATAL DHA+COMPLETE PRENATAL PO) Take  by mouth. * Follow-up Care/Patient Instructions:   Activity: No sex for 6 weeks, No driving while on analgesics and No heavy lifting for 6 weeks  Diet: Regular Diet  Wound Care: Keep wound clean and dry    Follow-up Information     Follow up With Specialties Details Why Contact Info    Other, Phys, MD    Patient can only remember the practice name and not the physician

## 2020-09-05 NOTE — PROGRESS NOTES
Patient discharged to home per MD orders. Discharge instructions reviewed with patient. Questions encouraged and answered. Patient verbalizes understanding.          Patient to call out when ready to be escorted out

## 2020-09-05 NOTE — DISCHARGE INSTRUCTIONS
Patient Education        After Your Delivery (the Postpartum Period): Care Instructions  Your Care Instructions     Congratulations on the birth of your baby. Like pregnancy, the  period can be a time of excitement, maria teresa, and exhaustion. You may look at your wondrous little baby and feel happy. You may also be overwhelmed by your new sleep hours and new responsibilities. At first, babies often sleep during the days and are awake at night. They do not have a pattern or routine. They may make sudden gasps, jerk themselves awake, or look like they have crossed eyes. These are all normal, and they may even make you smile. In these first weeks after delivery, try to take good care of yourself. It may take 4 to 6 weeks to feel like yourself again, and possibly longer if you had a  birth. You will likely feel very tired for several weeks. Your days will be full of ups and downs, but lots of maria teresa as well. Follow-up care is a key part of your treatment and safety. Be sure to make and go to all appointments, and call your doctor if you are having problems. It's also a good idea to know your test results and keep a list of the medicines you take. How can you care for yourself at home? Take care of your body after delivery  · Use pads instead of tampons for the bloody flow that may last as long as 2 weeks. · Ease cramps with ibuprofen (Advil, Motrin). · Ease soreness of hemorrhoids and the area between your vagina and rectum with ice compresses or witch hazel pads. · Ease constipation by drinking lots of fluid and eating high-fiber foods. Ask your doctor about over-the-counter stool softeners. · Cleanse yourself with a gentle squeeze of warm water from a bottle instead of wiping with toilet paper. · Take a sitz bath in warm water several times a day. · Wear a good nursing bra. Ease sore and swollen breasts with warm, wet washcloths.   · If you are not breastfeeding, use ice rather than heat for breast soreness. · Your period may not start for several months if you are breastfeeding. You may bleed more, and longer at first, than you did before you got pregnant. · Wait until you are healed (about 4 to 6 weeks) before you have sexual intercourse. Your doctor will tell you when it is okay to have sex. · Try not to travel with your baby for 5 or 6 weeks. If you take a long car trip, make frequent stops to walk around and stretch. Avoid exhaustion  · Rest every day. Try to nap when your baby naps. · Ask another adult to be with you for a few days after delivery. · Plan for  if you have other children. · Stay flexible so you can eat at odd hours and sleep when you need to. Both you and your baby are making new schedules. · Plan small trips to get out of the house. Change can make you feel less tired. · Ask for help with housework, cooking, and shopping. Remind yourself that your job is to care for your baby. Know about help for postpartum depression  · \"Baby blues\" are common for the first 1 to 2 weeks after birth. You may cry or feel sad or irritable for no reason. · Rest whenever you can. Being tired makes it harder to handle your emotions. · Go for walks with your baby. · Talk to your partner, friends, and family about your feelings. · If your symptoms last for more than a few weeks, or if you feel very depressed, ask your doctor for help. · Postpartum depression can be treated. Support groups and counseling can help. Sometimes medicine can also help. Stay healthy  · Eat healthy foods so you have more energy and lose extra baby pounds. · If you breastfeed, avoid drugs. If you quit smoking during pregnancy, try to stay smoke-free. If you choose to have a drink now and then, have only one drink, and limit the number of occasions that you have a drink. Wait to breastfeed at least 2 hours after you have a drink to reduce the amount of alcohol the baby may get in the milk.   · Start daily exercise after 4 to 6 weeks, but rest when you feel tired. · Learn exercises to tone your belly. Do Kegel exercises to regain strength in your pelvic muscles. You can do these exercises while you stand or sit. ? Squeeze the same muscles you would use to stop your urine. Your belly and thighs should not move. ? Hold the squeeze for 3 seconds, and then relax for 3 seconds. ? Start with 3 seconds. Then add 1 second each week until you are able to squeeze for 10 seconds. ? Repeat the exercise 10 to 15 times for each session. Do three or more sessions each day. · Find a class for new mothers and new babies that has an exercise time. · If you had a  birth, give yourself a bit more time before you exercise, and be careful. When should you call for help? Call  911 anytime you think you may need emergency care. For example, call if:    · You have thoughts of harming yourself, your baby, or another person.     · You passed out (lost consciousness).     · You have chest pain, are short of breath, or cough up blood.     · You have a seizure. Call your doctor now or seek immediate medical care if:    · You have severe vaginal bleeding. This means you are passing blood clots and soaking through a pad each hour for 2 or more hours.     · You are dizzy or lightheaded, or you feel like you may faint.     · You have a fever.     · You have new or more belly pain.     · You have signs of a blood clot in your leg (called a deep vein thrombosis), such as:  ? Pain in the calf, back of the knee, thigh, or groin. ? Redness and swelling in your leg or groin.     · You have signs of preeclampsia, such as:  ? Sudden swelling of your face, hands, or feet. ? New vision problems (such as dimness, blurring, or seeing spots). ? A severe headache.    Watch closely for changes in your health, and be sure to contact your doctor if:    · Your vaginal bleeding seems to be getting heavier.     · You have new or worse vaginal discharge.     · You feel sad, anxious, or hopeless for more than a few days.     · You do not get better as expected. Where can you learn more? Go to http://marta-jayy.info/  Enter A461 in the search box to learn more about \"After Your Delivery (the Postpartum Period): Care Instructions. \"  Current as of: February 11, 2020               Content Version: 12.6  © 2006-2020 [employer]. Care instructions adapted under license by [employer] (which disclaims liability or warranty for this information). If you have questions about a medical condition or this instruction, always ask your healthcare professional. [place] [address] any warranty or liability for your use of this information. DISCHARGE SUMMARY from Nurse    PATIENT INSTRUCTIONS:    After general anesthesia or intravenous sedation, for 24 hours or while taking prescription Narcotics:  · Limit your activities  · Do not drive and operate hazardous machinery  · Do not make important personal or business decisions  · Do  not drink alcoholic beverages  · If you have not urinated within 8 hours after discharge, please contact your surgeon on call. Report the following to your surgeon:  · Excessive pain, swelling, redness or odor of or around the surgical area  · Temperature over 100.5  · Nausea and vomiting lasting longer than 4 hours or if unable to take medications  · Any signs of decreased circulation or nerve impairment to extremity: change in color, persistent  numbness, tingling, coldness or increase pain  · Any questions    What to do at Home:    Nothing in the vagina for 6 weeks. Call MD if experiencing heavy vaginal bleeding greater than 1 pad per hour, temperature over 100.4, foul smelling vaginal discharge, thoughts of suicide or homicide, symptoms of postpartum depression or mastitis, or any other major medical concerns.               *  Please give a list of your current medications to your Primary Care Provider. *  Please update this list whenever your medications are discontinued, doses are      changed, or new medications (including over-the-counter products) are added. *  Please carry medication information at all times in case of emergency situations. These are general instructions for a healthy lifestyle:    No smoking/ No tobacco products/ Avoid exposure to second hand smoke  Surgeon General's Warning:  Quitting smoking now greatly reduces serious risk to your health. Obesity, smoking, and sedentary lifestyle greatly increases your risk for illness    A healthy diet, regular physical exercise & weight monitoring are important for maintaining a healthy lifestyle    You may be retaining fluid if you have a history of heart failure or if you experience any of the following symptoms:  Weight gain of 3 pounds or more overnight or 5 pounds in a week, increased swelling in our hands or feet or shortness of breath while lying flat in bed. Please call your doctor as soon as you notice any of these symptoms; do not wait until your next office visit. The discharge information has been reviewed with the patient. The patient verbalized understanding. Discharge medications reviewed with the patient and appropriate educational materials and side effects teaching were provided.   ___________________________________________________________________________________________________________________________________

## 2020-09-05 NOTE — PROGRESS NOTES
Progress Note                               Patient: Kam Arceo MRN: [id number]  SSN: [id number]    YOB: 2000  Age: 23 y.o. Sex: female      Postpartum Day Number 2    Subjective:     Patient doing well postpartum without significant complaints. Voiding without difficulty. Patient reports normal lochia. Pain well controlled, voiding on her own without difficulty. Breast feeding. Denies HA, SOB/CP, RUQ pain, Vision changes. Objective:     Patient Vitals for the past 12 hrs:   Temp Pulse Resp BP SpO2   20 0726 98.3 °F (36.8 °C) 95 16 99/60 95 %       Temp (24hrs), Av.2 °F (36.8 °C), Min:98 °F (36.7 °C), Max:98.3 °F (36.8 °C)      Physical Exam:    Constitutional: She appears well-developed and well-nourished. No distress. HENT:    Head: Normocephalic and atraumatic. Cardiovascular: RRR  Pulmonary/Chest: CTAB  Abd:  S/NTTP/ND, BS normoactive, fundus firm at umbilicus  Ext:  No c/c/e      Lab/Data Review:  CBC:    Recent Labs     20  1809   WBC 11.8*   HGB 12.4   HCT 36.0        BMP/CMP:  No results for input(s): NA, K, CL, CO2, AGAP, GLU, BUN, CREA, GFRAA, GFRNA, CA, MG, PHOS, ALB, TBIL, CBIL, TP, AP, GLOB, AGRAT, ALT in the last 72 hours.     No lab exists for component: SGOT, GPT  GBS: No results found for: GRBSEXT, GRBSEXT  Blood Type:   Lab Results   Component Value Date/Time    ABO/Rh(D) A POSITIVE 2020 06:08 PM        Assessment and Plan:     23 y.o.  ppd# 2 s/p :    1) PP:  Meeting all pp goals, continue routine care; appropriate for DC home today  2) Breast feeding, Rh pos, Rub imm           Signed By: Av Johnson MD     2020

## 2020-09-08 NOTE — PROGRESS NOTES
Referral made to [facility]  home visit program.    [facility] [address]   [phone]

## 2020-09-17 NOTE — ADT AUTH CERT NOTES
Patient Demographics     Patient Name   Melody Shannon  Sex   Female     2000  Address   [address]  Phone   [phone] [facility] Account     Name  Acct ID  Class  Status  Primary Coverage    Melody Shannon  [id number]  INPATIENT  Discharged/Not Billed  [facility] - [address]            Guarantor Account (for Hospital Account [de-identified])     Name  Relation to Pt  Service Area  Active? Acct Type    Melody Shannon  Self  [facility]  Yes  Personal/Family    Address  Phone      [address], [facility]  [phone](R)              Coverage Information (for Hospital Account [de-identified])     1. BLUE CROSS STATE/[place] BLUE CROSS STATE     F/O Payor/Plan  Subscriber   Subscriber Sex  Precert #    BLUE [place]OSS STATE/[place] BLUE CROSS STATE  89  F     Subscriber  Subscriber #    Melody Shannon  [id number]    Grp #  Group Name    [id number]     Address  Phone    [address], [address] [place] [address]     Policy Number  Status  Effective Date  Benefits Phone    [id number]  -   -    Auth/Cert    SENDRECORDS    2. BLUE CHOICE HEALTHPLAN MEDICAID/[place] BLUE CHOICE HEALTHPLAN MEDICAID     F/O Payor/Plan  Subscriber   Subscriber Sex  Precert #    BLUE CHOICE HEALTHPLAN MEDICAID/SC BLUE CHOICE HEALTHPLAN MEDICAID  89  F     Subscriber  Subscriber #    Melody Shannon  [id number]    Grp #  Group Name    [id number]     Address  Phone     [address]   [address]     Policy Number  Status  Effective Date  Benefits Phone    [id number]  -   -    Auth/Cert               Admission Information     Arrival Date/Time:  2020 1642  Admit Date/Time:  2020 1642  IP Adm.  Date/Time:  2020 1752    Admission Type:  Elective  Point of Origin:  Clinic Or Physician Office  Admit Category:     Means of Arrival:   Primary Service:  Obstetrics  Secondary Service:  N/A    Transfer Source:   Service Area:  [facility] [address] [facility]  Unit:   [address]    Admit Provider:  Collin Douglas MD  Attending Provider:  Francisco Wheeler MD  Referring Provider:     Admission Information     Attending Provider  Admission Dx  Admitted on     38 weeks gestation of pregnancy, IUGR (intrauterine growth restriction) affecting care of mother, Encounter for planned induction of labor  20    Service  Isolation  Code Status    OBSTETRICS   Prior    Allergies  Advance Care Planning     Sulfa (Sulfonamide Antibiotics)  Jump to the Activity      Admission Information     Unit/Bed:  [facility] 4 MOTHER INFANT  Service:  OBSTETRICS    Admitting provider:  Collin Douglas MD  Phone:  [phone]    Attending provider:   Phone:     PCP:  Shane Jimenez MD  Phone:     Admission dx:  Contractions  Patient class:  I    Admission type:  Gregory Carcamo   MRN: [id number]         Link to Baby   Comment           [de-identified] name  MRN  Account    Age  Sex  Admission Type    Brielle Sanabria  [id number]  [id number]  9/3/20  2 wk.o.  F  Confirmed Discharge         OB History     Para  Term    AB  Living    1  1  1  0  0  1      SAB  TAB  Ectopic  Molar  Multiple  Live Births    0  0  0  0  0  1      #  Outcome  Date  GA  Labor/2nd  Weight  Sex  Delivery  Anes  PTL  Lv  A1  A5    1  Term  20  38w0d  8h 17m / 0h 52m  2.72 kg  F  Vag-Spont  Epidural  N  Living  6  9    Name: Wilner Ernst     Location: Other     Delivering Clinician: Francisco Wheeler MD          Prenatal History      Most Recent Value    Did You Receive Prenatal Care  Yes    Name Of OB Provider  Dr. Tom Bach [facility] (Maternal Fetal Medicine)? Yes    Fetal Ultrasound Abnormalities/Concerns?   Yes [\"baby was measuring small\"]    Infant Feeding  Breast Milk    Circumcision Planned  --    Pediatrician After Birth/ Follow Up Baby Visits  Undecided         Dating Summary   Working BARAK: 20  set by Raymundo Meek RN on 20 based on Ultrasound on 20      Based On  BARAK  GA Dif  GA  User  Date    Last Menstrual Period on 19 (Exact Date)  20  +5w0d   System action - copied  20    Ultrasound on 20  Working  Mac Farris RN  20         Prenatal Results   Prenatal Labs  Screening/Diagnostics    Test  Value  Date  Time  Test  Value  Date  Time    ABO/Rh     AFP Only       Antibody Scrn     AFP Tetra       Hgb     Hgb Electrophoresis       Hct     Amniocentesis       Platelet     Cystic Fibrosis       Rubella     Thalessemia       RPR     Frederick-Sachs       T. Pallidum Antibody     Canavan       Urine     PAP Smear       Hep B Surf AG     FREE BETA HCG       Hep C     NT       HIV     NIPT       Gonorrhea     Additional Results        Test  Value  Date  Time        GTT, Fasting       Chlamydia     GTT, 1HR       TSH     GTT, 2HR       GTT, 1 HR (Glucola)     GTT, 3HR       GTT, Fasting     RPR       GTT, 1 HR     FREE BETA HCG       GTT, 2 HR     CMV AB       GTT, 3 HR     TOXOPLASMA AB       3rd Trimester  VARICELLA ZOSTER AB       Test  Value  Date  Time        Hgb           Hct           Platelet           Group B Strep           Antibody Scrn           TSH           T. Pallidum Antibody           Hep B Surf AG           Gonorrhea           Chlamydia           Legend   *: Historical   View all results for this pregnancy         Charlette Gold [[id number]]    Delivery   Birth date/time: 9/3/2021 02:07:09   Delivery type: Vaginal, Spontaneous   Labor Event Times   Labor onset date/time: 2020 1658   Dilation complete date/time: 9/3/2020 0115   Start pushing date/time: 9/3/2020 0205   Labor Events    labor?: No    steroids?: None   Cervical ripening type: None   Antibiotics during labor?: No   Rupture date/time: 2020   Rupture type: AROM   Fluid odor: None   Induction: None   Induction date/time: 2020 1820   Augmentation: AROM, Oxytocin Augmentation date/time: 2020   Augmentation indications: Ineffective Contraction Pattern   Labor/Delivery complications: None   Delivery Providers   Delivering clinician: Francisco Wheeler MD   Provider  Role    Francisco Wheeler MD  Obstetrician    Alexandra Osborne  Primary Nurse    Prachi Srinivasan  Primary [place] Nurse    Millie Ramos MD  Neonatologist    Gerry Santacruz  Scrub Tech    Teressa Slipper, RT  Respiratory Therapist    [facility] Nurse    Gerry Santacruz  Scrub Tech    Anesthesia   Method: Epidural   Multiple Birth Onset Second Stage   Second Stage Start Date: 9/3/20  Second Stage Start Time: 115    Operative Delivery   Forceps attempted?: No   Vacuum extractor attempted?: No   Presentation   Presentation: Vertex   Position: Occiput Anterior   Apgars   Living status: Living   Apgars:  1 min. :  5 min.:  10 min. :  15 min.:  20 min.:    Skin color:  0  1       Heart rate:  2  2       Reflex irritability:  2  2       Muscle tone:  1  2       Respiratory effort:  1  2       Total:  6  9       Apgars assigned by: DR. Tere Thorpe RN   Resuscitation   Method: Tactile Stimulation, Suctioning-bulb   Shoulder Dystocia   Shoulder dystocia present?: No   Measurements   Weight: 2720 g   Length: 47 cm   Head circumference: 34.5 cm   Chest circumference: 32 cm   Lacerations   Episiotomy: None     Lacerations: None   Repair Needed:   # of Repair Packets:   Suture Type and Size:   Suture Comment:   Estimated Blood Loss (mL):     Placenta   Placenta Date/Time: 9/3/2020 0215   Removal: Spontaneous   Appearance: Normal, Intact  Placenta disposition: Discarded    Vaginal Counts   Initial count personnel: DR. Domonique Carlos RN   Final count personnel: DR. Domonique Carlos RN   Accurate final count?: Yes      Delivery Summary History   Event  User  Date/Time    Signed  Francisco Wheeler MD  9/3/2020 02:32:13            Name: Sae Carrier MRN: [id number]  SSN: [id number]    Date of Birth: 2000  Age: 23 y.o. Sex: female           Chief Complaint   Patient presents with    Contractions        Subjective:      Estimated Date of Delivery: 20                    OB History    Para Term  AB Living   1 0 0 0 0 0   SAB TAB Ectopic Molar Multiple Live Births    0 0 0 0 0 0       # Outcome Date GA Lbr Abel/2nd Weight Sex Delivery Anes PTL Lv   1 Current                          Ms. Jessica Rabago is seen with pregnancy at 37w6d for active labor. Prenatal course was complicated by iugr and is scheduled for induction tomorrow. the patients states that the baby moves as usual   Please see prenatal records for details.          Past Medical History:   Diagnosis Date    PCOS (polycystic ovarian syndrome)             Past Surgical History:   Procedure Laterality Date    HX TONSILLECTOMY        HX WISDOM TEETH EXTRACTION         Social History            Occupational History    Not on file   Tobacco Use    Smoking status: Never Smoker    Smokeless tobacco: Never Used   Substance and Sexual Activity    Alcohol use: Not Currently    Drug use: Never    Sexual activity: Yes       Partners: Male       Birth control/protection: None           Family History   Problem Relation Age of Onset    Hypertension Father      Diabetes Paternal Grandmother      Breast Cancer Neg Hx      Colon Cancer Neg Hx      Ovarian Cancer Neg Hx               Allergies   Allergen Reactions    Sulfa (Sulfonamide Antibiotics) Hives             Prior to Admission medications    Medication Sig Start Date End Date Taking?  Authorizing Provider   calcium-cholecalciferol, d3, (CALCIUM 600 + D) 600-125 mg-unit tab Take  by mouth.       Provider, Historical   [id number] autumn/folic/dha (PRENATAL DHA+COMPLETE PRENATAL PO) Take  by mouth.       Provider, Historical         Review of Systems:  Constitutional:No headache, fever  Cardiac:   No chest pain      Resp: No cough or shortness of breath     GI:   No nausea/vomiting, diarrhea, abdominal pain    :   No dysuria  Neuro:     No vision changes, headache        Objective:      Vitals:       Vitals:     20 1658 20 1721   BP: 133/80     Pulse: (!) 101     Resp: 18     Temp: 98.7 °F (37.1 °C)     Weight:   73 kg (161 lb)   Height:   5' 7\" (1.702 m)         Physical Exam:  Patient without distress. Heart: Regular rate and rhythm  Lung: clear to auscultation throughout lung fields, no wheezes, no rales, no rhonchi and normal respiratory effort  Back: costovertebral angle tenderness absent  Abdomen: soft, nontender, without guarding, without rebound  Fundus: soft and non tender  Cervical Exam: 5 cm dilated    80% effaced    0 station    Presenting Part: cephalic  Lower Extremities:  - Edema No  Membranes:  Intact  Fetal Heart Rate tracing: Category 1  Uterine contractions: irregular, every 4-6 minutes     Prenatal Labs:   gbs neg     Assessment/Plan:      Ms. Lucio Gonzalez is a  seen with pregnancy at 37w6d for active labor, known fetal growth restriction, reassuring fhr tracing.            Plan:      Admit for labor management     Patient discussed with Dr. Cindy Garcia By:  Ruth Sexton MD      2020

## 2021-11-01 PROBLEM — O36.5990 IUGR (INTRAUTERINE GROWTH RESTRICTION) AFFECTING CARE OF MOTHER: Status: RESOLVED | Noted: 2020-09-02 | Resolved: 2021-11-01

## 2021-11-01 PROBLEM — O26.843 UTERINE SIZE DATE DISCREPANCY PREGNANCY, THIRD TRIMESTER: Status: RESOLVED | Noted: 2020-07-08 | Resolved: 2021-11-01

## 2021-11-01 PROBLEM — O36.5990 PREGNANCY AFFECTED BY FETAL GROWTH RESTRICTION: Status: RESOLVED | Noted: 2020-08-20 | Resolved: 2021-11-01

## 2021-11-01 PROBLEM — O98.511 COVID-19 AFFECTING PREGNANCY IN FIRST TRIMESTER: Status: ACTIVE | Noted: 2021-11-01

## 2021-11-01 PROBLEM — Z87.898 H/O POOR FETAL GROWTH: Status: ACTIVE | Noted: 2021-11-01

## 2021-11-01 PROBLEM — Z34.90 ENCOUNTER FOR PLANNED INDUCTION OF LABOR: Status: RESOLVED | Noted: 2020-09-02 | Resolved: 2021-11-01

## 2021-11-01 PROBLEM — O26.873 SHORT CERVIX, ANTEPARTUM, THIRD TRIMESTER: Status: RESOLVED | Noted: 2020-07-08 | Resolved: 2021-11-01

## 2021-11-01 PROBLEM — E28.2 POLYCYSTIC OVARY AFFECTING PREGNANCY, ANTEPARTUM: Status: RESOLVED | Noted: 2018-07-12 | Resolved: 2021-11-01

## 2021-11-01 PROBLEM — O36.5910 POOR FETAL GROWTH AFFECTING MANAGEMENT OF MOTHER IN FIRST TRIMESTER: Status: RESOLVED | Noted: 2021-11-01 | Resolved: 2021-11-01

## 2021-11-01 PROBLEM — O34.80 POLYCYSTIC OVARY AFFECTING PREGNANCY, ANTEPARTUM: Status: RESOLVED | Noted: 2018-07-12 | Resolved: 2021-11-01

## 2021-11-01 PROBLEM — O36.5910 POOR FETAL GROWTH AFFECTING MANAGEMENT OF MOTHER IN FIRST TRIMESTER: Status: ACTIVE | Noted: 2021-11-01

## 2021-11-01 PROBLEM — O09.91 HIGH-RISK PREGNANCY IN FIRST TRIMESTER: Status: ACTIVE | Noted: 2021-11-01

## 2021-11-01 PROBLEM — O09.93 HIGH-RISK PREGNANCY IN THIRD TRIMESTER: Status: RESOLVED | Noted: 2020-07-28 | Resolved: 2021-11-01

## 2021-11-01 PROBLEM — O09.291 HISTORY OF PRIOR PREGNANCY WITH SHORT CERVIX, CURRENTLY PREGNANT IN FIRST TRIMESTER: Status: ACTIVE | Noted: 2021-11-01

## 2021-11-01 PROBLEM — Z87.42 HISTORY OF POLYCYSTIC OVARIAN DISEASE: Status: ACTIVE | Noted: 2021-11-01

## 2021-11-01 PROBLEM — Z3A.38 38 WEEKS GESTATION OF PREGNANCY: Status: RESOLVED | Noted: 2020-09-02 | Resolved: 2021-11-01

## 2021-11-01 PROBLEM — U07.1 COVID-19 AFFECTING PREGNANCY IN FIRST TRIMESTER: Status: ACTIVE | Noted: 2021-11-01

## 2021-12-23 PROBLEM — O26.872 CERVICAL SHORTENING IN SECOND TRIMESTER: Status: ACTIVE | Noted: 2021-12-23

## 2021-12-23 PROBLEM — O09.891 MEDICATION EXPOSURE DURING FIRST TRIMESTER OF PREGNANCY: Status: ACTIVE | Noted: 2021-12-23

## 2021-12-23 PROBLEM — O09.92 HIGH-RISK PREGNANCY IN SECOND TRIMESTER: Status: ACTIVE | Noted: 2021-11-01

## 2021-12-23 PROBLEM — O09.292 HISTORY OF PRIOR PREGNANCY WITH SHORT CERVIX, CURRENTLY PREGNANT IN SECOND TRIMESTER: Status: ACTIVE | Noted: 2021-11-01

## 2022-01-03 PROBLEM — O34.80 POLYCYSTIC OVARY AFFECTING PREGNANCY, ANTEPARTUM: Status: ACTIVE | Noted: 2021-11-01

## 2022-01-03 PROBLEM — E28.2 POLYCYSTIC OVARY AFFECTING PREGNANCY, ANTEPARTUM: Status: ACTIVE | Noted: 2021-11-01

## 2022-03-19 PROBLEM — O09.292 HISTORY OF PRIOR PREGNANCY WITH SHORT CERVIX, CURRENTLY PREGNANT IN SECOND TRIMESTER: Status: ACTIVE | Noted: 2021-11-01

## 2022-03-19 PROBLEM — E28.2 POLYCYSTIC OVARY AFFECTING PREGNANCY, ANTEPARTUM: Status: ACTIVE | Noted: 2021-11-01

## 2022-03-19 PROBLEM — Z87.898 H/O POOR FETAL GROWTH: Status: ACTIVE | Noted: 2021-11-01

## 2022-03-19 PROBLEM — O09.891 MEDICATION EXPOSURE DURING FIRST TRIMESTER OF PREGNANCY: Status: ACTIVE | Noted: 2021-12-23

## 2022-03-19 PROBLEM — O34.80 POLYCYSTIC OVARY AFFECTING PREGNANCY, ANTEPARTUM: Status: ACTIVE | Noted: 2021-11-01

## 2022-03-20 PROBLEM — O09.92 HIGH-RISK PREGNANCY IN SECOND TRIMESTER: Status: ACTIVE | Noted: 2021-11-01

## 2022-03-20 PROBLEM — O26.872 CERVICAL SHORTENING IN SECOND TRIMESTER: Status: ACTIVE | Noted: 2021-12-23

## 2022-03-20 PROBLEM — O98.511 COVID-19 AFFECTING PREGNANCY IN FIRST TRIMESTER: Status: ACTIVE | Noted: 2021-11-01

## 2022-03-20 PROBLEM — U07.1 COVID-19 AFFECTING PREGNANCY IN FIRST TRIMESTER: Status: ACTIVE | Noted: 2021-11-01

## 2022-05-03 ENCOUNTER — HOSPITAL ENCOUNTER (INPATIENT)
Age: 22
LOS: 4 days | Discharge: HOME | End: 2022-05-07
Attending: OBSTETRICS & GYNECOLOGY | Admitting: OBSTETRICS & GYNECOLOGY
Payer: COMMERCIAL

## 2022-05-03 DIAGNOSIS — O36.8390 ANTEPARTUM NON-REASSURING FETAL HEART RATE OR RHYTHM AFFECTING CARE OF MOTHER: ICD-10-CM

## 2022-05-03 DIAGNOSIS — U07.1 COVID-19 AFFECTING PREGNANCY IN FIRST TRIMESTER: ICD-10-CM

## 2022-05-03 DIAGNOSIS — O98.511 COVID-19 AFFECTING PREGNANCY IN FIRST TRIMESTER: ICD-10-CM

## 2022-05-03 DIAGNOSIS — O09.93 HIGH-RISK PREGNANCY IN THIRD TRIMESTER: ICD-10-CM

## 2022-05-03 PROCEDURE — 65270000029 HC RM PRIVATE

## 2022-05-04 ENCOUNTER — ANESTHESIA EVENT (OUTPATIENT)
Dept: LABOR AND DELIVERY | Age: 22
End: 2022-05-04
Payer: COMMERCIAL

## 2022-05-04 ENCOUNTER — ANESTHESIA (OUTPATIENT)
Dept: LABOR AND DELIVERY | Age: 22
End: 2022-05-04
Payer: COMMERCIAL

## 2022-05-04 PROBLEM — O36.8390 ANTEPARTUM NON-REASSURING FETAL HEART RATE OR RHYTHM AFFECTING CARE OF MOTHER: Status: ACTIVE | Noted: 2022-05-04

## 2022-05-04 PROBLEM — O09.93 HIGH-RISK PREGNANCY IN THIRD TRIMESTER: Status: ACTIVE | Noted: 2021-11-01

## 2022-05-04 PROBLEM — O36.5990 IUGR (INTRAUTERINE GROWTH RESTRICTION) AFFECTING CARE OF MOTHER: Status: ACTIVE | Noted: 2022-05-04

## 2022-05-04 LAB
ABO + RH BLD: NORMAL
ARTERIAL PATENCY WRIST A: ABNORMAL
ARTERIAL PATENCY WRIST A: ABNORMAL
BASE DEFICIT BLD-SCNC: 5.1 MMOL/L
BASE DEFICIT BLD-SCNC: 5.4 MMOL/L
BLOOD GROUP ANTIBODIES SERPL: NORMAL
ERYTHROCYTE [DISTWIDTH] IN BLOOD BY AUTOMATED COUNT: 13.3 % (ref 11.9–14.6)
HCO3 BLD-SCNC: 21.4 MMOL/L (ref 22–26)
HCO3 BLDV-SCNC: 19.8 MMOL/L (ref 23–28)
HCT VFR BLD AUTO: 36.1 % (ref 35.8–46.3)
HGB BLD-MCNC: 12.3 G/DL (ref 11.7–15.4)
KLEIHAUER-BETKE,EKLB: NORMAL %
MCH RBC QN AUTO: 31.4 PG (ref 26.1–32.9)
MCHC RBC AUTO-ENTMCNC: 34.1 G/DL (ref 31.4–35)
MCV RBC AUTO: 92.1 FL (ref 79.6–97.8)
NRBC # BLD: 0 K/UL (ref 0–0.2)
O2/TOTAL GAS SETTING VFR VENT: 21 %
O2/TOTAL GAS SETTING VFR VENT: 21 %
PCO2 BLDCO: 35 MMHG (ref 32–68)
PCO2 BLDCO: 48 MMHG (ref 32–68)
PH BLDCO: 7.26 [PH] (ref 7.15–7.38)
PH BLDCO: 7.36 [PH] (ref 7.15–7.38)
PLATELET # BLD AUTO: 121 K/UL (ref 150–450)
PMV BLD AUTO: 12.1 FL (ref 9.4–12.3)
PO2 BLDCO: 19 MMHG
PO2 BLDCO: 35 MMHG
RBC # BLD AUTO: 3.92 M/UL (ref 4.05–5.2)
SAO2 % BLD: 21.9 % (ref 95–98)
SAO2 % BLDV: 65.4 % (ref 65–88)
SERVICE CMNT-IMP: ABNORMAL
SERVICE CMNT-IMP: ABNORMAL
SPECIMEN EXP DATE BLD: NORMAL
SPECIMEN TYPE: ABNORMAL
SPECIMEN TYPE: ABNORMAL
WBC # BLD AUTO: 7.8 K/UL (ref 4.3–11.1)

## 2022-05-04 PROCEDURE — 77030031139 HC SUT VCRL2 J&J -A: Performed by: OBSTETRICS & GYNECOLOGY

## 2022-05-04 PROCEDURE — 74011000250 HC RX REV CODE- 250: Performed by: OBSTETRICS & GYNECOLOGY

## 2022-05-04 PROCEDURE — 75410000003 HC RECOV DEL/VAG/CSECN EA 0.5 HR: Performed by: OBSTETRICS & GYNECOLOGY

## 2022-05-04 PROCEDURE — 77030018836 HC SOL IRR NACL ICUM -A: Performed by: OBSTETRICS & GYNECOLOGY

## 2022-05-04 PROCEDURE — 2709999900 HC NON-CHARGEABLE SUPPLY

## 2022-05-04 PROCEDURE — 76010000391 HC C SECN FIRST 1 HR: Performed by: OBSTETRICS & GYNECOLOGY

## 2022-05-04 PROCEDURE — 82803 BLOOD GASES ANY COMBINATION: CPT

## 2022-05-04 PROCEDURE — 74011000250 HC RX REV CODE- 250: Performed by: ANESTHESIOLOGY

## 2022-05-04 PROCEDURE — 65270000029 HC RM PRIVATE

## 2022-05-04 PROCEDURE — 4A1HXCZ MONITORING OF PRODUCTS OF CONCEPTION, CARDIAC RATE, EXTERNAL APPROACH: ICD-10-PCS | Performed by: OBSTETRICS & GYNECOLOGY

## 2022-05-04 PROCEDURE — 77030032490 HC SLV COMPR SCD KNE COVD -B: Performed by: OBSTETRICS & GYNECOLOGY

## 2022-05-04 PROCEDURE — 74011250637 HC RX REV CODE- 250/637: Performed by: OBSTETRICS & GYNECOLOGY

## 2022-05-04 PROCEDURE — 74011250636 HC RX REV CODE- 250/636: Performed by: ANESTHESIOLOGY

## 2022-05-04 PROCEDURE — 85460 HEMOGLOBIN FETAL: CPT

## 2022-05-04 PROCEDURE — 76010000392 HC C SECN EA ADDL 0.5 HR: Performed by: OBSTETRICS & GYNECOLOGY

## 2022-05-04 PROCEDURE — 85027 COMPLETE CBC AUTOMATED: CPT

## 2022-05-04 PROCEDURE — 77030002933 HC SUT MCRYL J&J -A: Performed by: OBSTETRICS & GYNECOLOGY

## 2022-05-04 PROCEDURE — 77030034696 HC CATH URETH FOL 2W BARD -A: Performed by: OBSTETRICS & GYNECOLOGY

## 2022-05-04 PROCEDURE — 59510 CESAREAN DELIVERY: CPT | Performed by: OBSTETRICS & GYNECOLOGY

## 2022-05-04 PROCEDURE — 75410000002 HC LABOR FEE PER 1 HR

## 2022-05-04 PROCEDURE — 3E0DXGC INTRODUCTION OF OTHER THERAPEUTIC SUBSTANCE INTO MOUTH AND PHARYNX, EXTERNAL APPROACH: ICD-10-PCS | Performed by: OBSTETRICS & GYNECOLOGY

## 2022-05-04 PROCEDURE — 2709999900 HC NON-CHARGEABLE SUPPLY: Performed by: OBSTETRICS & GYNECOLOGY

## 2022-05-04 PROCEDURE — 74011250636 HC RX REV CODE- 250/636: Performed by: REGISTERED NURSE

## 2022-05-04 PROCEDURE — 77030002974 HC SUT PLN J&J -A: Performed by: OBSTETRICS & GYNECOLOGY

## 2022-05-04 PROCEDURE — 77030002888 HC SUT CHRMC J&J -A: Performed by: OBSTETRICS & GYNECOLOGY

## 2022-05-04 PROCEDURE — 77030003665 HC NDL SPN BBMI -A: Performed by: REGISTERED NURSE

## 2022-05-04 PROCEDURE — 77030039266 HC ADH SKN EXOFIN S2SG -A: Performed by: OBSTETRICS & GYNECOLOGY

## 2022-05-04 PROCEDURE — 74011250636 HC RX REV CODE- 250/636: Performed by: OBSTETRICS & GYNECOLOGY

## 2022-05-04 PROCEDURE — 36415 COLL VENOUS BLD VENIPUNCTURE: CPT

## 2022-05-04 PROCEDURE — 74011250636 HC RX REV CODE- 250/636

## 2022-05-04 PROCEDURE — 76060000078 HC EPIDURAL ANESTHESIA: Performed by: OBSTETRICS & GYNECOLOGY

## 2022-05-04 PROCEDURE — 77030008459 HC STPLR SKN COOP -B: Performed by: OBSTETRICS & GYNECOLOGY

## 2022-05-04 PROCEDURE — 86900 BLOOD TYPING SEROLOGIC ABO: CPT

## 2022-05-04 PROCEDURE — 74011250637 HC RX REV CODE- 250/637: Performed by: ANESTHESIOLOGY

## 2022-05-04 PROCEDURE — 74011000250 HC RX REV CODE- 250: Performed by: REGISTERED NURSE

## 2022-05-04 PROCEDURE — 88307 TISSUE EXAM BY PATHOLOGIST: CPT

## 2022-05-04 PROCEDURE — 77030007880 HC KT SPN EPDRL BBMI -B: Performed by: REGISTERED NURSE

## 2022-05-04 RX ORDER — ONDANSETRON 2 MG/ML
INJECTION INTRAMUSCULAR; INTRAVENOUS AS NEEDED
Status: DISCONTINUED | OUTPATIENT
Start: 2022-05-04 | End: 2022-05-04 | Stop reason: HOSPADM

## 2022-05-04 RX ORDER — SODIUM CHLORIDE, SODIUM LACTATE, POTASSIUM CHLORIDE, CALCIUM CHLORIDE 600; 310; 30; 20 MG/100ML; MG/100ML; MG/100ML; MG/100ML
75 INJECTION, SOLUTION INTRAVENOUS CONTINUOUS
Status: DISCONTINUED | OUTPATIENT
Start: 2022-05-04 | End: 2022-05-05 | Stop reason: ALTCHOICE

## 2022-05-04 RX ORDER — EPHEDRINE SULFATE/0.9% NACL/PF 50 MG/5 ML
SYRINGE (ML) INTRAVENOUS AS NEEDED
Status: DISCONTINUED | OUTPATIENT
Start: 2022-05-04 | End: 2022-05-04 | Stop reason: HOSPADM

## 2022-05-04 RX ORDER — KETOROLAC TROMETHAMINE 30 MG/ML
INJECTION, SOLUTION INTRAMUSCULAR; INTRAVENOUS AS NEEDED
Status: DISCONTINUED | OUTPATIENT
Start: 2022-05-04 | End: 2022-05-04 | Stop reason: HOSPADM

## 2022-05-04 RX ORDER — OXYCODONE HYDROCHLORIDE 5 MG/1
10 TABLET ORAL
Status: DISCONTINUED | OUTPATIENT
Start: 2022-05-04 | End: 2022-05-05 | Stop reason: ALTCHOICE

## 2022-05-04 RX ORDER — NALOXONE HYDROCHLORIDE 0.4 MG/ML
0.1 INJECTION, SOLUTION INTRAMUSCULAR; INTRAVENOUS; SUBCUTANEOUS
Status: DISCONTINUED | OUTPATIENT
Start: 2022-05-04 | End: 2022-05-05 | Stop reason: ALTCHOICE

## 2022-05-04 RX ORDER — TERBUTALINE SULFATE 1 MG/ML
INJECTION SUBCUTANEOUS
Status: COMPLETED
Start: 2022-05-04 | End: 2022-05-04

## 2022-05-04 RX ORDER — OXYTOCIN/RINGER'S LACTATE 30/500 ML
PLASTIC BAG, INJECTION (ML) INTRAVENOUS
Status: DISCONTINUED | OUTPATIENT
Start: 2022-05-04 | End: 2022-05-04 | Stop reason: HOSPADM

## 2022-05-04 RX ORDER — FENTANYL CITRATE 50 UG/ML
INJECTION, SOLUTION INTRAMUSCULAR; INTRAVENOUS AS NEEDED
Status: DISCONTINUED | OUTPATIENT
Start: 2022-05-04 | End: 2022-05-04 | Stop reason: HOSPADM

## 2022-05-04 RX ORDER — HYDROMORPHONE HYDROCHLORIDE 1 MG/ML
1 INJECTION, SOLUTION INTRAMUSCULAR; INTRAVENOUS; SUBCUTANEOUS
Status: DISCONTINUED | OUTPATIENT
Start: 2022-05-04 | End: 2022-05-05 | Stop reason: ALTCHOICE

## 2022-05-04 RX ORDER — DIPHENHYDRAMINE HYDROCHLORIDE 50 MG/ML
12.5 INJECTION, SOLUTION INTRAMUSCULAR; INTRAVENOUS
Status: DISCONTINUED | OUTPATIENT
Start: 2022-05-04 | End: 2022-05-05 | Stop reason: ALTCHOICE

## 2022-05-04 RX ORDER — SODIUM CHLORIDE, SODIUM LACTATE, POTASSIUM CHLORIDE, CALCIUM CHLORIDE 600; 310; 30; 20 MG/100ML; MG/100ML; MG/100ML; MG/100ML
INJECTION, SOLUTION INTRAVENOUS
Status: DISCONTINUED | OUTPATIENT
Start: 2022-05-04 | End: 2022-05-04 | Stop reason: HOSPADM

## 2022-05-04 RX ORDER — MINERAL OIL
120 OIL (ML) ORAL
Status: DISCONTINUED | OUTPATIENT
Start: 2022-05-04 | End: 2022-05-04 | Stop reason: HOSPADM

## 2022-05-04 RX ORDER — TRISODIUM CITRATE DIHYDRATE AND CITRIC ACID MONOHYDRATE 500; 334 MG/5ML; MG/5ML
30 SOLUTION ORAL
Status: COMPLETED | OUTPATIENT
Start: 2022-05-04 | End: 2022-05-04

## 2022-05-04 RX ORDER — LIDOCAINE HYDROCHLORIDE 20 MG/ML
JELLY TOPICAL
Status: DISCONTINUED | OUTPATIENT
Start: 2022-05-04 | End: 2022-05-04 | Stop reason: HOSPADM

## 2022-05-04 RX ORDER — BUPIVACAINE HYDROCHLORIDE 7.5 MG/ML
INJECTION, SOLUTION INTRASPINAL AS NEEDED
Status: DISCONTINUED | OUTPATIENT
Start: 2022-05-04 | End: 2022-05-04 | Stop reason: HOSPADM

## 2022-05-04 RX ORDER — LIDOCAINE HYDROCHLORIDE 10 MG/ML
1 INJECTION INFILTRATION; PERINEURAL
Status: DISCONTINUED | OUTPATIENT
Start: 2022-05-04 | End: 2022-05-04 | Stop reason: HOSPADM

## 2022-05-04 RX ORDER — TERBUTALINE SULFATE 1 MG/ML
0.25 INJECTION SUBCUTANEOUS
Status: COMPLETED | OUTPATIENT
Start: 2022-05-04 | End: 2022-05-04

## 2022-05-04 RX ORDER — KETOROLAC TROMETHAMINE 30 MG/ML
30 INJECTION, SOLUTION INTRAMUSCULAR; INTRAVENOUS EVERY 6 HOURS
Status: DISPENSED | OUTPATIENT
Start: 2022-05-04 | End: 2022-05-05

## 2022-05-04 RX ORDER — SODIUM CHLORIDE 0.9 % (FLUSH) 0.9 %
5-40 SYRINGE (ML) INJECTION AS NEEDED
Status: DISCONTINUED | OUTPATIENT
Start: 2022-05-04 | End: 2022-05-05 | Stop reason: ALTCHOICE

## 2022-05-04 RX ORDER — SODIUM CHLORIDE 0.9 % (FLUSH) 0.9 %
5-40 SYRINGE (ML) INJECTION EVERY 8 HOURS
Status: DISCONTINUED | OUTPATIENT
Start: 2022-05-04 | End: 2022-05-05 | Stop reason: ALTCHOICE

## 2022-05-04 RX ORDER — MORPHINE SULFATE 0.5 MG/ML
INJECTION, SOLUTION EPIDURAL; INTRATHECAL; INTRAVENOUS AS NEEDED
Status: DISCONTINUED | OUTPATIENT
Start: 2022-05-04 | End: 2022-05-04 | Stop reason: HOSPADM

## 2022-05-04 RX ORDER — BUTORPHANOL TARTRATE 2 MG/ML
1 INJECTION INTRAMUSCULAR; INTRAVENOUS
Status: DISCONTINUED | OUTPATIENT
Start: 2022-05-04 | End: 2022-05-04 | Stop reason: HOSPADM

## 2022-05-04 RX ORDER — CEFAZOLIN SODIUM/WATER 2 G/20 ML
2 SYRINGE (ML) INTRAVENOUS ONCE
Status: COMPLETED | OUTPATIENT
Start: 2022-05-04 | End: 2022-05-04

## 2022-05-04 RX ADMIN — Medication 5 MG: at 03:30

## 2022-05-04 RX ADMIN — SODIUM CHLORIDE, SODIUM LACTATE, POTASSIUM CHLORIDE, AND CALCIUM CHLORIDE 75 ML/HR: 600; 310; 30; 20 INJECTION, SOLUTION INTRAVENOUS at 06:56

## 2022-05-04 RX ADMIN — FENTANYL CITRATE 25 MCG: 50 INJECTION INTRAMUSCULAR; INTRAVENOUS at 03:51

## 2022-05-04 RX ADMIN — TERBUTALINE SULFATE: 1 INJECTION SUBCUTANEOUS at 02:52

## 2022-05-04 RX ADMIN — MORPHINE SULFATE 150 MCG: 0.5 INJECTION, SOLUTION EPIDURAL; INTRATHECAL; INTRAVENOUS at 03:20

## 2022-05-04 RX ADMIN — FENTANYL CITRATE 25 MCG: 50 INJECTION INTRAMUSCULAR; INTRAVENOUS at 03:58

## 2022-05-04 RX ADMIN — PHENYLEPHRINE HYDROCHLORIDE 160 MCG: 10 INJECTION INTRAVENOUS at 03:23

## 2022-05-04 RX ADMIN — CEFAZOLIN SODIUM 2 G: 10 INJECTION, POWDER, FOR SOLUTION INTRAVENOUS at 02:58

## 2022-05-04 RX ADMIN — Medication 50 MCG: at 00:34

## 2022-05-04 RX ADMIN — BUPIVACAINE HYDROCHLORIDE IN DEXTROSE 1.6 ML: 7.5 INJECTION, SOLUTION SUBARACHNOID at 03:20

## 2022-05-04 RX ADMIN — KETOROLAC TROMETHAMINE 30 MG: 30 INJECTION, SOLUTION INTRAMUSCULAR at 20:12

## 2022-05-04 RX ADMIN — Medication 500 ML/HR: at 03:35

## 2022-05-04 RX ADMIN — PHENYLEPHRINE HYDROCHLORIDE 100 MCG: 10 INJECTION INTRAVENOUS at 03:42

## 2022-05-04 RX ADMIN — PHENYLEPHRINE HYDROCHLORIDE 160 MCG: 10 INJECTION INTRAVENOUS at 03:21

## 2022-05-04 RX ADMIN — PHENYLEPHRINE HYDROCHLORIDE 160 MCG: 10 INJECTION INTRAVENOUS at 03:28

## 2022-05-04 RX ADMIN — SODIUM CHLORIDE, SODIUM LACTATE, POTASSIUM CHLORIDE, AND CALCIUM CHLORIDE 500 ML: 600; 310; 30; 20 INJECTION, SOLUTION INTRAVENOUS at 01:18

## 2022-05-04 RX ADMIN — PHENYLEPHRINE HYDROCHLORIDE 160 MCG: 10 INJECTION INTRAVENOUS at 03:25

## 2022-05-04 RX ADMIN — SODIUM CITRATE AND CITRIC ACID MONOHYDRATE 30 ML: 500; 334 SOLUTION ORAL at 02:59

## 2022-05-04 RX ADMIN — ONDANSETRON 4 MG: 2 INJECTION INTRAMUSCULAR; INTRAVENOUS at 03:38

## 2022-05-04 RX ADMIN — FAMOTIDINE 20 MG: 10 INJECTION, SOLUTION INTRAVENOUS at 02:57

## 2022-05-04 RX ADMIN — PHENYLEPHRINE HYDROCHLORIDE 160 MCG: 10 INJECTION INTRAVENOUS at 03:47

## 2022-05-04 RX ADMIN — SODIUM CHLORIDE, SODIUM LACTATE, POTASSIUM CHLORIDE, AND CALCIUM CHLORIDE: 600; 310; 30; 20 INJECTION, SOLUTION INTRAVENOUS at 03:11

## 2022-05-04 RX ADMIN — TERBUTALINE SULFATE: 1 INJECTION, SOLUTION SUBCUTANEOUS at 02:52

## 2022-05-04 RX ADMIN — PHENYLEPHRINE HYDROCHLORIDE 160 MCG: 10 INJECTION INTRAVENOUS at 03:30

## 2022-05-04 RX ADMIN — KETOROLAC TROMETHAMINE 30 MG: 30 INJECTION, SOLUTION INTRAMUSCULAR; INTRAVENOUS at 04:09

## 2022-05-04 NOTE — H&P
History & Physical    Name: Beverley Saenz MRN: [id number]  SSN: [id number]    YOB: 2000  Age: 24 y.o. Sex: female      Subjective:     Estimated Date of Delivery: 22  OB History    Para Term  AB Living   2 1 1 0 0 1   SAB IAB Ectopic Molar Multiple Live Births   0 0 0 0 0 1      # Outcome Date GA Lbr Abel/2nd Weight Sex Delivery Anes PTL Lv   2 Current            1 Term 20 38w0d 08:17 / 00:52 5 lb 15.9 oz (2.72 kg) F Vag-Spont EPI N BIBI       Ms. Jeb Granado is admitted with pregnancy at 39w2d for induction of labor due to 39wks with hx covid earlier in this pregnancy, hx FGR with prior pregnancy. Prenatal course was complicated by the same and short cervical length. Please see prenatal records for details. Past Medical History:   Diagnosis Date    45 weeks gestation of pregnancy 2020    Encounter for planned induction of labor 2020    High-risk pregnancy in third trimester 2020    1)prenatal labs normal  See Poor Fetal Growth Overview    IUGR (intrauterine growth restriction) affecting care of mother 2020    PCOS (polycystic ovarian syndrome)     Polycystic ovary affecting pregnancy, antepartum 2018    See Poor Fetal Growth Overview    Poor fetal growth affecting management of mother in first trimester 2021    Pregnancy affected by fetal growth restriction 2020    33w6d,  2020 EFW 4 lb 1 oz 17%, AC 2.3 % (7 % Nicolaides), DIANA 14  35w1d,   DIANA  10  35w6d  DIANA 8, FGR, refer to M, needs 2x/wk surveillance. BR w/ BRP d/w pt. FMP reviewed. GIRL  35w6,  2020 at [facility]:  Normal anatomy/fetal Echo. Fetal growth restriction, normal DIANA; reassuring fetal status. AC 5%, overall 10%, DIANA 15 cm, UA Dopplers WNL, BPP   No follow up scheduled a    Short cervix, antepartum, third trimester 2020    CL at 28wks checked d/t complaints of pressure : CL 2cm w/ mall V-shaped funneling.  Started on vaginal progesterone as precaution after discussing r/b/a    Uterine size date discrepancy pregnancy, third trimester 7/8/2020    19yo G1 Growth US 6/30/2020 @ 28w5d : EFW 38%ile, AC 16%ile, CL 2cm w/ small V-shaped funnel. DIANA 15. (Pt taken out of work given having increased pelvic pressure, will repeat growth in 3-4wks)     Past Surgical History:   Procedure Laterality Date    HX TONSILLECTOMY      HX WISDOM TEETH EXTRACTION       Social History     Occupational History    Not on file   Tobacco Use    Smoking status: Never Smoker    Smokeless tobacco: Never Used   Vaping Use    Vaping Use: Never used   Substance and Sexual Activity    Alcohol use: Not Currently    Drug use: Never    Sexual activity: Yes     Partners: Male     Birth control/protection: None     Family History   Problem Relation Age of Onset    Hypertension Father     Diabetes Paternal Grandmother     Breast Cancer Neg Hx     Colon Cancer Neg Hx     Ovarian Cancer Neg Hx        Allergies   Allergen Reactions    Sulfa (Sulfonamide Antibiotics) Hives     Prior to Admission medications    Medication Sig Start Date End Date Taking? Authorizing Provider   BABY ASPIRIN PO Take  by mouth. Yes Provider, Historical   calcium carbonate (TUMS PO) Take  by mouth. Yes Provider, Historical   cholecalciferol, vitamin D3, 50 mcg (2,000 unit) tab Take 1 Tablet by mouth daily. 2/21/22  Yes Yeni Almaguer MD   PNV HE.79/AMVCGRF fum/folic ac (PRENATAL PO) Take  by mouth. Yes Provider, Historical        Review of Systems: Pertinent items are noted in the History of Present Illness. Objective:     Vitals:  Vitals:    05/03/22 2359 05/04/22 0047   BP: 111/62    Pulse: 98    Resp: 16    Temp: 97.6 °F (36.4 °C)    SpO2: 98%    Weight:  161 lb (73 kg)   Height:  5' 7\" (1.702 m)        Physical Exam:  Patient without distress.   Heart: Regular rate and rhythm  Lung: clear to auscultation throughout lung fields, no wheezes, no rales, no rhonchi and normal respiratory effort  Cervical Exam: posterior and dimple per nurse  Membranes:  Intact  Fetal Heart Rate: baseline 140/moderate variability/no accels/late decels with almost every ctx          Prenatal Labs:   Lab Results   Component Value Date/Time    ABO/Rh(D) A POSITIVE 2022 12:15 AM       Impression/Plan:     Active Problems:    COVID-19 affecting pregnancy in first trimester (2021)      Overview: 2021 at [facility]: Covid Day 4: C/o s/s of C19 starting 10/29/21 with +       test on 10/31/21. C/o sore throat, HA, congestion, slight cough. Called       to discuss Regeneron infusion with pt. Pt undecided. 2021 at [facility]: Pt did not rec'v Regeneron. Recommend monitoring of fetal well-being for remainder of pregnancy.  Growth at 28, 32 wk in OB office.  Dependent upon maternal and fetal status, may require  testing. High-risk pregnancy in second trimester (2021)      Overview: 2021 at [facility]: Normal anatomy/echo; CL 2.6 cm, no funneling;  DIANA       WNL. 1/3/2022 at [facility]: Cervix 3.6cm without ripening. Plan: Admit for induction of labor. Group B Strep negative. I was called due to the late decels pt began having after 1st dose cytotec. Explained to pt and partner this pattern indicates poor placental fxn and does not cher well for fetal tolerance of labor. This started after 1st dose, with ctx's so mild she was sleeping and not feeling them. cx is very unfavorable. I recommend expediting delivery by CS. She does not wish to proceed with this. Explained that we cannot give her anymore cytotec. And cannot start pitocin either if this pattern continues.

## 2022-05-04 NOTE — ANESTHESIA PREPROCEDURE EVALUATION
Relevant Problems   No relevant active problems       Anesthetic History               Review of Systems / Medical History  Patient summary reviewed and pertinent labs reviewed    Pulmonary                   Neuro/Psych              Cardiovascular                  Exercise tolerance: >4 METS     GI/Hepatic/Renal                Endo/Other             Other Findings   Comments: Fetal tracing bad           Physical Exam    Airway  Mallampati: II  TM Distance: > 6 cm  Neck ROM: normal range of motion   Mouth opening: Normal     Cardiovascular  Regular rate and rhythm,  S1 and S2 normal,  no murmur, click, rub, or gallop  Rhythm: regular  Rate: normal         Dental  No notable dental hx       Pulmonary  Breath sounds clear to auscultation               Abdominal         Other Findings            Anesthetic Plan    ASA: 1, emergent  Anesthesia type: spinal            Anesthetic plan and risks discussed with: Patient

## 2022-05-04 NOTE — OP NOTES
Section Delivery Operative Report       Patient: Camryn Flores MRN: [id number]  SSN: [id number]    YOB: 2000  Age: 24 y.o. Sex: female       Date of Procedure: 2022     Preoperative Diagnosis: 39wks IUP for induction, repetitive late decels    Postoperative Diagnosis: same with op delivery viable male infant, admitted to NICU    Procedure: LTCS  Section    Surgeon(s):  Sherry Logan MD    Anesthesia:  Spinal    Prophylactic Antibiotics: Ancef    Estimated Blood Loss:  730cc             Complications:  None. No evidence of abruption. Procedure Details: The patient was taken to the operating room, where spinal anesthesia was administered and found to be adequate. Damico catheter had been placed using sterile technique. The patient was prepped and draped in the normal sterile fashion. Pfannenstiel skin incision was made with the scalpel and carried down to the underlying fascia. The fascial incision was extended laterally with duggan scissors. The fascia incision was grasped with Kocher clamps, tented up, and the underlying rectus muscles were dissected off bluntly. The rectus muscle was divided in the midline bluntly. The peritoneum was entered bluntly with hemostat and extended inferiorly and superiorly with Metzenbaum scissors. The bladder blade was then inserted. The vesicouterine peritoneum was identified, grasped with pick-ups and entered sharply with Metzenbaum scissors. The bladder flap was then created digitally and the bladder blade was inserted. A low transverse uterine incision was made with the scalpel and extended laterally with blunt finger dissection. The babys head was then delivered atraumatically. The nose and mouth were suctioned. The cord was clamped and cut and the baby was handed off to the waiting  care unit staff. Placenta was then delivered by expression. The uterus was exteriorized and cleared of all clots and debris.  The uterine incision was closed in 2 layers. The first layer was a running locking layer of 1 Chromic. The second layer was an imbricating layer of 1 Chromic with good hemostasis assured. The pelvis was washed with warm normal saline. The uterus was returned to the abdomen. Good hemostasis was again reassured throughout. Parietal peritoneum was closed with 2-0 vicryl in a running fashion. The  fascia was closed with 1 vicryl in a running fashion. Good hemostasis was assured. The subcuticular layers were reapproximated with 2-0 plain gut in interrupted fashion. The skin was closed with absorbable staples. The patient tolerated the procedure well. Sponge, lap, and needle counts were correct times three and the patient was taken to her postpartum room in stable condition. The baby is in the NICU. See separate delivery note.      Signed By: Mikaela Carrillo MD     May 4, 2022

## 2022-05-04 NOTE — PROGRESS NOTES
The late decels continue  They had good Qs about the decel pattern, risks of surgery for mother and baby, experience with spinal.  Reviewed that there is no risk to the baby - baby status is the reason for rec CS  Risks to mother include bldg complications, infxn, injury to organs like bowel/bladder. They are ready to proceed.

## 2022-05-04 NOTE — PROGRESS NOTES
Patient requesting to go see baby in SCN. Pt up and transported to wheel chair. Damico intact. Pt rolled down by FOB to SCN. Educated to call for needs or concerns. Pt verbalizes understanding.

## 2022-05-04 NOTE — PROGRESS NOTES
Dr. Oanh Davison called and updated on FHT strip interventions done by THOMAS Araujo To come to the unit for assess

## 2022-05-04 NOTE — PROGRESS NOTES
Into check on patient for catheter removal and FOB states pt had just fallen asleep for the first time since . Dr. Ramila Torres called and okay to leave baker in longer for patient to rest and can remove after she wakes up.

## 2022-05-04 NOTE — L&D DELIVERY NOTE
Delivery Summary    Patient: Jacklyn Acosta MRN: [id number]  SSN: [id number]    YOB: 2000  Age: 24 y.o. Sex: female        Information for the patient's :  Kal Quinones [[id number]]       Labor Events:    Labor: No    Steroids: None   Cervical Ripening Date/Time:       Cervical Ripening Type: Misoprostol   Antibiotics During Labor: No   Rupture Identifier:      Rupture Date/Time: 2022 3:34 AM   Rupture Type: AROM   Amniotic Fluid Volume: Moderate    Amniotic Fluid Description: Clear    Amniotic Fluid Odor: None    Induction: None       Induction Date/Time:        Indications for Induction:      Augmentation: None   Augmentation Date/Time:      Indications for Augmentation:     Labor complications: Fetal Intolerance       Additional complications:        Delivery Events:  Indications For Episiotomy:     Episiotomy: None   Perineal Laceration(s): None   Repaired:     Periurethral Laceration Location:      Repaired:     Labial Laceration Location:     Repaired:     Sulcal Laceration Location:     Repaired:     Vaginal Laceration Location:     Repaired:     Cervical Laceration Location:     Repaired:     Repair Suture: None   Number of Repair Packets:     Estimated Blood Loss (ml):  ml   Quantitaive Blood Loss (ml):             Delivery Date: 2022    Delivery Time: 3:34 AM   Delivery Type: , Low Transverse     Details    Trial of Labor: Yes   Primary/Repeat: Primary   Priority: Routine   Indications:  Fetal Intolerance of Labor       Sex:  Male     Gestational Age: 44w2d  Delivery Clinician:  Ashleigh Cabral  Living Status: Living   Delivery Location: L&D  OR OR 2          APGARS  One minute Five minutes Ten minutes   Skin color:            Heart rate:            Grimace:            Muscle tone:            Breathing: Totals:              Presentation: Vertex    Position: Left Occiput Anterior  Resuscitation Method:  Suctioning-bulb; Tactile Stimulation; Oxygen     Meconium Stained: None      Cord Information: 3 Vessels  Complications: Nuchal Cord With Compressions  Cord around: head  Delayed cord clamping? Yes  Cord clamped date/time:   Disposition of Cord Blood: Lab    Blood Gases Sent?: Yes    Placenta:  Date/Time: 2022  3:34 AM  Removal: Expressed      Appearance: Normal;Intact     [place] Measurements:  Birth Weight:        Birth Length:        Head Circumference:        Chest Circumference:       Abdominal Girth: Other Providers:   Sugey SUGGS BETHANY;MAUREEN FUNG;ROSANGELA WHITNEY;MOODY DARDEN;ANGELINE ELLIS;DANIEL CR;ASIA MADDOX;EDILBERTO OCAMPO, Obstetrician;Primary Nurse;Primary  Nurse;Scrub Tech;Neonatologist;Anesthesiologist;Crna;Scrub Tech;Respiratory Therapist             Group B Strep: No results found for: GRBSEXT, GRBSEXT  Information for the patient's :  Vandanabebe Ken [[id number]]   No results found for: ABORH, PCTABR, PCTDIG, BILI, ABORHEXT, ABORH     Recent Labs     22  0405   PCO2CB 48   PO2CB 19   HCO3I 21.4*   SO2I 21.9*   IBD 5.4   SPECTI ARTERIAL CORD   PHICB 7.26   IALLEN NOT APPLICABLE        Baby had cry and tone on op field. Delayed cord clamp x30sec. Quickly showed baby to parents, then to warmer. NICU team present. Baby noted to be pale. Currently in NICU on 100% O2 with good sat. Hct pending. Placenta had small areas of calcification. No significant clot or bloody fluid noted. Have ordered KB stain on mom.

## 2022-05-04 NOTE — ANESTHESIA PROCEDURE NOTES
Spinal Block    Performed by: Corrinne Marseille, MD  Authorized by: Corrinne Marseille, MD     Pre-procedure:   Indications: primary anesthetic  Preanesthetic Checklist: patient identified, risks and benefits discussed, anesthesia consent, patient being monitored and timeout performed    Timeout Time: 03:17 EDT          Spinal Block:   Patient Position:  Seated  Prep Region:  Lumbar  Prep: Betadine      Location:  L3-4  Technique:  Single shot    Local Dose (mL):  3    Needle:   Needle Type:  Pencan  Needle Gauge:  25 G  Attempts:  1      Events: CSF confirmed, no blood with aspiration and no paresthesia        Assessment:  Insertion:  Uncomplicated  Patient tolerance:  Patient tolerated the procedure well with no immediate complications

## 2022-05-04 NOTE — PROGRESS NOTES
Pt admitted to room 434. EFM on and tracing. IV started, labs collected and sent. Consents signed. POC reviewed. Bed low and locked, call light within reach. Significant other at bedside. No needs/concerns at this time.

## 2022-05-04 NOTE — ANESTHESIA POSTPROCEDURE EVALUATION
Procedure(s):   SECTION.     spinal    Anesthesia Post Evaluation      Multimodal analgesia: multimodal analgesia not used between 6 hours prior to anesthesia start to PACU discharge  Patient location during evaluation: bedside  Patient participation: complete - patient participated  Level of consciousness: awake and alert  Pain management: adequate  Airway patency: patent  Anesthetic complications: no  Cardiovascular status: hemodynamically stable  Respiratory status: acceptable  Hydration status: acceptable        INITIAL Post-op Vital signs:   Vitals Value Taken Time   /59 22 0614   Temp 36.6 °C (97.9 °F) 22 0424   Pulse 106 22 0614   Resp 18 22 0614   SpO2 96 % 22 1882

## 2022-05-05 LAB
HCT VFR BLD AUTO: 28.8 % (ref 35.8–46.3)
HGB BLD-MCNC: 9.6 G/DL (ref 11.7–15.4)

## 2022-05-05 PROCEDURE — 36415 COLL VENOUS BLD VENIPUNCTURE: CPT

## 2022-05-05 PROCEDURE — 85018 HEMOGLOBIN: CPT

## 2022-05-05 PROCEDURE — 65270000029 HC RM PRIVATE

## 2022-05-05 PROCEDURE — 74011250637 HC RX REV CODE- 250/637: Performed by: OBSTETRICS & GYNECOLOGY

## 2022-05-05 PROCEDURE — 74011250636 HC RX REV CODE- 250/636: Performed by: ANESTHESIOLOGY

## 2022-05-05 PROCEDURE — 2709999900 HC NON-CHARGEABLE SUPPLY

## 2022-05-05 RX ORDER — ZOLPIDEM TARTRATE 5 MG/1
5 TABLET ORAL
Status: DISCONTINUED | OUTPATIENT
Start: 2022-05-05 | End: 2022-05-07 | Stop reason: HOSPADM

## 2022-05-05 RX ORDER — DEXTROSE, SODIUM CHLORIDE, SODIUM LACTATE, POTASSIUM CHLORIDE, AND CALCIUM CHLORIDE 5; .6; .31; .03; .02 G/100ML; G/100ML; G/100ML; G/100ML; G/100ML
125 INJECTION, SOLUTION INTRAVENOUS CONTINUOUS
Status: DISCONTINUED | OUTPATIENT
Start: 2022-05-05 | End: 2022-05-05 | Stop reason: ALTCHOICE

## 2022-05-05 RX ORDER — SODIUM CHLORIDE 0.9 % (FLUSH) 0.9 %
5-40 SYRINGE (ML) INJECTION EVERY 8 HOURS
Status: DISCONTINUED | OUTPATIENT
Start: 2022-05-05 | End: 2022-05-05 | Stop reason: ALTCHOICE

## 2022-05-05 RX ORDER — SIMETHICONE 80 MG
80 TABLET,CHEWABLE ORAL
Status: DISCONTINUED | OUTPATIENT
Start: 2022-05-05 | End: 2022-05-07 | Stop reason: HOSPADM

## 2022-05-05 RX ORDER — DIPHENHYDRAMINE HCL 25 MG
25 CAPSULE ORAL
Status: DISCONTINUED | OUTPATIENT
Start: 2022-05-05 | End: 2022-05-07 | Stop reason: HOSPADM

## 2022-05-05 RX ORDER — DOCUSATE SODIUM 100 MG/1
100 CAPSULE, LIQUID FILLED ORAL 2 TIMES DAILY
Status: DISCONTINUED | OUTPATIENT
Start: 2022-05-05 | End: 2022-05-07 | Stop reason: HOSPADM

## 2022-05-05 RX ORDER — SODIUM CHLORIDE 0.9 % (FLUSH) 0.9 %
5-40 SYRINGE (ML) INJECTION AS NEEDED
Status: DISCONTINUED | OUTPATIENT
Start: 2022-05-05 | End: 2022-05-05 | Stop reason: ALTCHOICE

## 2022-05-05 RX ORDER — OXYTOCIN/RINGER'S LACTATE 30/500 ML
87.3 PLASTIC BAG, INJECTION (ML) INTRAVENOUS AS NEEDED
Status: DISCONTINUED | OUTPATIENT
Start: 2022-05-05 | End: 2022-05-05 | Stop reason: ALTCHOICE

## 2022-05-05 RX ORDER — OXYTOCIN/RINGER'S LACTATE 30/500 ML
1-25 PLASTIC BAG, INJECTION (ML) INTRAVENOUS
Status: DISCONTINUED | OUTPATIENT
Start: 2022-05-05 | End: 2022-05-05 | Stop reason: ALTCHOICE

## 2022-05-05 RX ORDER — IBUPROFEN 600 MG/1
600 TABLET ORAL EVERY 6 HOURS
Status: DISCONTINUED | OUTPATIENT
Start: 2022-05-05 | End: 2022-05-07 | Stop reason: HOSPADM

## 2022-05-05 RX ORDER — OXYTOCIN/RINGER'S LACTATE 30/500 ML
10 PLASTIC BAG, INJECTION (ML) INTRAVENOUS AS NEEDED
Status: DISCONTINUED | OUTPATIENT
Start: 2022-05-05 | End: 2022-05-05 | Stop reason: ALTCHOICE

## 2022-05-05 RX ORDER — NALOXONE HYDROCHLORIDE 0.4 MG/ML
0.4 INJECTION, SOLUTION INTRAMUSCULAR; INTRAVENOUS; SUBCUTANEOUS AS NEEDED
Status: DISCONTINUED | OUTPATIENT
Start: 2022-05-05 | End: 2022-05-07 | Stop reason: HOSPADM

## 2022-05-05 RX ORDER — HYDROCODONE BITARTRATE AND ACETAMINOPHEN 7.5; 325 MG/1; MG/1
1 TABLET ORAL
Status: DISCONTINUED | OUTPATIENT
Start: 2022-05-05 | End: 2022-05-07 | Stop reason: HOSPADM

## 2022-05-05 RX ORDER — ONDANSETRON 2 MG/ML
4 INJECTION INTRAMUSCULAR; INTRAVENOUS
Status: DISCONTINUED | OUTPATIENT
Start: 2022-05-05 | End: 2022-05-07 | Stop reason: HOSPADM

## 2022-05-05 RX ADMIN — DOCUSATE SODIUM 100 MG: 100 CAPSULE ORAL at 17:02

## 2022-05-05 RX ADMIN — IBUPROFEN 600 MG: 600 TABLET ORAL at 17:02

## 2022-05-05 RX ADMIN — HYDROCODONE BITARTRATE AND ACETAMINOPHEN 1 TABLET: 7.5; 325 TABLET ORAL at 18:07

## 2022-05-05 RX ADMIN — IBUPROFEN 600 MG: 600 TABLET ORAL at 22:48

## 2022-05-05 RX ADMIN — DOCUSATE SODIUM 100 MG: 100 CAPSULE ORAL at 11:22

## 2022-05-05 RX ADMIN — SIMETHICONE 80 MG: 80 TABLET, CHEWABLE ORAL at 22:03

## 2022-05-05 RX ADMIN — HYDROCODONE BITARTRATE AND ACETAMINOPHEN 1 TABLET: 7.5; 325 TABLET ORAL at 14:17

## 2022-05-05 RX ADMIN — HYDROCODONE BITARTRATE AND ACETAMINOPHEN 1 TABLET: 7.5; 325 TABLET ORAL at 22:03

## 2022-05-05 RX ADMIN — IBUPROFEN 600 MG: 600 TABLET ORAL at 11:22

## 2022-05-05 RX ADMIN — KETOROLAC TROMETHAMINE 30 MG: 30 INJECTION, SOLUTION INTRAMUSCULAR at 01:52

## 2022-05-05 NOTE — PROGRESS NOTES
Patient's IV removed from her Left hand at this time due to infiltration. Patient given a warm pack to put on her hand at this time.

## 2022-05-05 NOTE — PROGRESS NOTES
Anesthesiology  Post-op Note    Post-op day 1 s/p  via spinal with neuraxial opioids for post-op pain management. Visit Vitals  /69   Pulse 84   Temp 36.8 °C (98.3 °F)   Resp 16   Ht 5' 7\" (1.702 m)   Wt 73 kg (161 lb)   LMP 2021 (Approximate)   SpO2 96%   Breastfeeding Unknown   BMI 25.22 kg/m²     Airway patent, patient appropriately hydrated and appears euvolemic. Patient is Alert and oriented. Pain is well controlled. Pruritus is absent. Nausea is absent. No complaints about back or site of injection. Motor and sensory function has returned to baseline in lower extremities. Patient is satisfied with anesthetic and reports no complications. Continue current orders, then initiate surgeon's orders for pain management 24 hours after . Follow up per surgeon. Patient was in nursery with her baby when I saw her this morning. She was up walking. Denies any issues besides soreness at incision site. Very pleased with anesthesia care.

## 2022-05-05 NOTE — ROUTINE PROCESS
Allergies verbally verified with patient, administered norco as ordered, primary RN Yolanda Mars made aware

## 2022-05-05 NOTE — PROGRESS NOTES
[facility]  [address] [place] [address], [place] [id number], [address]  [address] [facility], MD, BONNIE Gil[place]-BC    Pt is S/P primary  at 39 2/7 weeks due to failed IOL, NR-FHTs. No complaints today. Normal PO pain. No GI/ issues. Lochia < menses. No F/C. VITALS  Patient Vitals for the past 24 hrs:   Temp Pulse Resp BP SpO2   22 0720 97.6 °F (36.4 °C) 97 18 110/76 97 %   22 0352 98.3 °F (36.8 °C) 84 16 105/69 96 %   22 2323 98.2 °F (36.8 °C) 96 16 107/66 97 %   22 1508 97.7 °F (36.5 °C) 87 18 106/79 98 %   22 1150 97.2 °F (36.2 °C) 89 16 105/72 97 %        CV - RRR  LUNGS - CTA bilaterally  ABD - soft, NABS, appropriate tenderness, incision C/D/I  EXT - tr edema bilaterally    Labs:    Recent Results (from the past 24 hour(s))   HGB & HCT    Collection Time: 22  9:40 AM   Result Value Ref Range    HGB 9.6 (L) 11.7 - 15.4 g/dL    HCT 28.8 (L) 35.8 - 46.3 %       POD #1 LTCS     Pt is breast feeding. No issues or complaints today. Stable. (+) ambulating, voiding.  Routine PP/PO care    Darinel Flores MD  11:12 AM  22

## 2022-05-05 NOTE — LACTATION NOTE
This note was copied from a baby's chart. In to mom's room initially to touch base w/ lactation per mom's request. Mom has been pumping and getting around 0.4-0.7 ml colostrum each time. Encouraged to keep pumping q 3, even when she gets to put baby to breast to protect supply until baby is feeding consistently well at breast and her milk is in. Baby getting extra formula supplementation as well now. Mom about to go to [facility] and offered to go w/ her as planned to try to latch baby for first time. We tried baby on Right breast in football hold. Baby was very sleepy and made hardly an effort even to open mouth. After trying for 5 minutes, let baby rest skin to skin w/ mom by her chest. Encouraged mom to pump after done holding baby.  Lactation to follow up in am.

## 2022-05-05 NOTE — PROGRESS NOTES
Patient's baker removed at this time. Primary RN ambulated patient to the bathroom. Patient's gown/sheets changed. Patient taught how to perform quin care.

## 2022-05-05 NOTE — PROGRESS NOTES
Called to patient's room. Patient asking questions about visiting hours. Patient states that she does not want her mother, Adama Briceno, to be allowed to visit her. She also states that she does not want her care discussed with her mother. Discussed with her that we could make her 'No Info' but that would prevent her from having any visitors or information sharing for the remainder of her hospital stay. Patient agrees. No Info paper signed by the patient and witnessed. Offered to change the patient's room but she declines at this time. Security and antepartum desk  notified.

## 2022-05-05 NOTE — PROGRESS NOTES
SW unsuccessfully attempted to meet with family several times throughout the day. SW will follow-up tomorrow.     Frandy White, ERI, [address] [facility]   [phone]

## 2022-05-06 PROCEDURE — 74011250637 HC RX REV CODE- 250/637: Performed by: OBSTETRICS & GYNECOLOGY

## 2022-05-06 PROCEDURE — 65270000029 HC RM PRIVATE

## 2022-05-06 PROCEDURE — 2709999900 HC NON-CHARGEABLE SUPPLY

## 2022-05-06 RX ADMIN — DOCUSATE SODIUM 100 MG: 100 CAPSULE ORAL at 18:02

## 2022-05-06 RX ADMIN — SIMETHICONE 80 MG: 80 TABLET, CHEWABLE ORAL at 10:28

## 2022-05-06 RX ADMIN — DOCUSATE SODIUM 100 MG: 100 CAPSULE ORAL at 10:28

## 2022-05-06 RX ADMIN — HYDROCODONE BITARTRATE AND ACETAMINOPHEN 1 TABLET: 7.5; 325 TABLET ORAL at 22:20

## 2022-05-06 RX ADMIN — SIMETHICONE 80 MG: 80 TABLET, CHEWABLE ORAL at 18:02

## 2022-05-06 RX ADMIN — IBUPROFEN 600 MG: 600 TABLET ORAL at 10:28

## 2022-05-06 RX ADMIN — IBUPROFEN 600 MG: 600 TABLET ORAL at 18:02

## 2022-05-06 RX ADMIN — HYDROCODONE BITARTRATE AND ACETAMINOPHEN 1 TABLET: 7.5; 325 TABLET ORAL at 02:42

## 2022-05-06 RX ADMIN — SIMETHICONE 80 MG: 80 TABLET, CHEWABLE ORAL at 22:20

## 2022-05-06 RX ADMIN — IBUPROFEN 600 MG: 600 TABLET ORAL at 04:17
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
## 2022-05-06 NOTE — PROGRESS NOTES
05/05/22 8494   Pain Assessment   Pain Scale 1 Numeric (0 - 10)   Pain Intensity 1 3   Pain Onset 1 Post Op   Pain Location 1 Abdomen; Incisional   Pain Orientation 1 Anterior; Lower   Pain Description 1 Aching;Cramping; Sore   Pain Intervention(s) 1 Medication (see MAR)   Vital Signs   Level of Consciousness Alert (0)   Pain Screen   Patient Stated Pain Goal 2   POSS Scale   Opioid Sedation Scale 1     PRN Motrin 800mg for pain.

## 2022-05-06 NOTE — LACTATION NOTE
This note was copied from a baby's chart. In to follow up with mom in her room. She stated that she continues to pump and is also attempting to  when in the nursery. She has expressed up to 6 ml when pumping. She has a personal pump at home to use. Reviewed with mom that lactation consultant can assist with breastfeeding as needed.

## 2022-05-06 NOTE — PROGRESS NOTES
Post-Operative Day Number 2 Progress Note  Danyelle Cortes  [id number]    Patient doing well post-op day 2 from  delivery without significant complaints. Pain controlled on current medication. Voiding without difficulty, normal lochia. Tolerating regular diet. Says her breastfeeding is going well. Vitals:  Patient Vitals for the past 8 hrs:   BP Temp Pulse Resp SpO2   22 0706 96/60 98.5 °F (36.9 °C) 91 16 97 %     Temp (24hrs), Av.4 °F (36.9 °C), Min:98.2 °F (36.8 °C), Max:98.6 °F (37 °C)      Vital signs stable, afebrile. Exam:  Patient without distress. In NICU breastfeeding her son            Labs: hgb 12.3--->9.6 after CS    Assessment and Plan:  Patient appears to be having uncomplicated post- course. Continue routine post-op care and maternal education. Will missy hgb sylvie for stability.

## 2022-05-07 VITALS
OXYGEN SATURATION: 97 % | DIASTOLIC BLOOD PRESSURE: 63 MMHG | HEIGHT: 67 IN | RESPIRATION RATE: 15 BRPM | WEIGHT: 161 LBS | SYSTOLIC BLOOD PRESSURE: 106 MMHG | BODY MASS INDEX: 25.27 KG/M2 | HEART RATE: 95 BPM | TEMPERATURE: 98.8 F

## 2022-05-07 LAB
HGB BLD-MCNC: 9.1 G/DL (ref 11.7–15.4)
HGB BLD-MCNC: 9.9 G/DL (ref 11.7–15.4)

## 2022-05-07 PROCEDURE — 36415 COLL VENOUS BLD VENIPUNCTURE: CPT

## 2022-05-07 PROCEDURE — 74011250637 HC RX REV CODE- 250/637: Performed by: OBSTETRICS & GYNECOLOGY

## 2022-05-07 PROCEDURE — 2709999900 HC NON-CHARGEABLE SUPPLY

## 2022-05-07 PROCEDURE — 85018 HEMOGLOBIN: CPT

## 2022-05-07 RX ORDER — ONDANSETRON 8 MG/1
8 TABLET, ORALLY DISINTEGRATING ORAL
Qty: 20 TABLET | Refills: 0 | Status: SHIPPED | OUTPATIENT
Start: 2022-05-07

## 2022-05-07 RX ORDER — HYDROCODONE BITARTRATE AND ACETAMINOPHEN 7.5; 325 MG/1; MG/1
1 TABLET ORAL
Qty: 20 TABLET | Refills: 0 | Status: SHIPPED | OUTPATIENT
Start: 2022-05-07 | End: 2022-05-14

## 2022-05-07 RX ORDER — IBUPROFEN 800 MG/1
800 TABLET ORAL
Qty: 60 TABLET | Refills: 0 | Status: SHIPPED | OUTPATIENT
Start: 2022-05-07

## 2022-05-07 RX ORDER — HYDROCODONE BITARTRATE AND ACETAMINOPHEN 7.5; 325 MG/1; MG/1
1 TABLET ORAL
Qty: 20 TABLET | Refills: 0 | Status: SHIPPED | OUTPATIENT
Start: 2022-05-07 | End: 2022-05-21

## 2022-05-07 RX ADMIN — IBUPROFEN 600 MG: 600 TABLET ORAL at 06:29

## 2022-05-07 RX ADMIN — IBUPROFEN 600 MG: 600 TABLET ORAL at 00:41

## 2022-05-07 RX ADMIN — IBUPROFEN 600 MG: 600 TABLET ORAL at 11:33

## 2022-05-07 RX ADMIN — HYDROCODONE BITARTRATE AND ACETAMINOPHEN 1 TABLET: 7.5; 325 TABLET ORAL at 14:39

## 2022-05-07 RX ADMIN — DOCUSATE SODIUM 100 MG: 100 CAPSULE ORAL at 11:32

## 2022-05-07 NOTE — DISCHARGE INSTRUCTIONS
Section: What to Expect at [address] [place] [address]     A  section, or , is surgery to deliver your baby through a cut that the doctor makes in your lower belly and uterus. The cut is called an incision. You may have some pain in your lower belly and need pain medicine for 1 to 2 weeks. You can expect some vaginal bleeding for several weeks. You will probably need about 6 weeks to fully recover. It's important to take it easy while the incision heals. Avoid heavy lifting, strenuous activities, and exercises that strain the belly muscles while you recover. Ask a family member or friend for help with housework, cooking, and shopping. This care sheet gives you a general idea about how long it will take for you to recover. But each person recovers at a different pace. Follow the steps below to get better as quickly as possible. How can you care for yourself at home? Activity    · Rest when you feel tired. Getting enough sleep will help you recover.     · Try to walk each day. Start by walking a little more than you did the day before. Bit by bit, increase the amount you walk. Walking boosts blood flow and helps prevent pneumonia, constipation, and blood clots.     · Avoid strenuous activities, such as bicycle riding, jogging, weightlifting, and aerobic exercise, for 6 weeks or until your doctor says it is okay.     · Until your doctor says it is okay, do not lift anything heavier than your baby.     · Do not do sit-ups or other exercises that strain the belly muscles for 6 weeks or until your doctor says it is okay.     · Hold a pillow over your incision when you cough or take deep breaths. This will support your belly and decrease your pain.     · You may shower as usual. Pat the incision dry when you are done.     · You will have some vaginal bleeding. Wear sanitary pads.  Do not douche or use tampons until your doctor says it is okay.     · Ask your doctor when you can drive again.     · You will probably need to take at least 6 weeks off work. It depends on the type of work you do and how you feel.     · Ask your doctor when it is okay for you to have sex. Diet    · You can eat your normal diet. If your stomach is upset, try bland, low-fat foods like plain rice, broiled chicken, toast, and yogurt.     · Drink plenty of fluids (unless your doctor tells you not to).     · You may notice that your bowel movements are not regular right after your surgery. This is common. Try to avoid constipation and straining with bowel movements. You may want to take a fiber supplement every day. If you have not had a bowel movement after a couple of days, ask your doctor about taking a mild laxative.     · If you are breastfeeding, limit alcohol. Alcohol can cause a lack of energy and other health problems for the baby when a breastfeeding woman drinks heavily. It can also get in the way of a mom's ability to feed her baby or to care for the child in other ways. There isn't a lot of research about exactly how much alcohol can harm a baby. Having no alcohol is the safest choice for your baby. If you choose to have a drink now and then, have only one drink, and limit the number of occasions that you have a drink. Wait to breastfeed at least 2 hours after you have a drink to reduce the amount of alcohol the baby may get in the milk. Medicines    · Your doctor will tell you if and when you can restart your medicines. You will also get instructions about taking any new medicines.     · If you take aspirin or some other blood thinner, ask your doctor if and when to start taking it again. Make sure that you understand exactly what your doctor wants you to do.     · Take pain medicines exactly as directed. ? If the doctor gave you a prescription medicine for pain, take it as prescribed.   ? If you are not taking a prescription pain medicine, ask your doctor if you can take an over-the-counter medicine.     · If you think your pain medicine is making you sick to your stomach:  ? Take your medicine after meals (unless your doctor has told you not to). ? Ask your doctor for a different pain medicine.     · If your doctor prescribed antibiotics, take them as directed. Do not stop taking them just because you feel better. You need to take the full course of antibiotics. Incision care    · If you have strips of tape on the incision, leave the tape on for a week or until it falls off.     · Wash the area daily with warm, soapy water, and pat it dry. Don't use hydrogen peroxide or alcohol, which can slow healing. You may cover the area with a gauze bandage if it weeps or rubs against clothing. Change the bandage every day.     · Keep the area clean and dry. Other instructions    · If you breastfeed your baby, you may be more comfortable while you are healing if you don't rest your baby on your belly. Try tucking your baby under your arm, with your baby's body along the side you will be feeding on. Support your baby's upper body with your arm. With that hand you can control your baby's head to bring your baby's mouth to your breast. This is sometimes called the football hold. Follow-up care is a key part of your treatment and safety. Be sure to make and go to all appointments, and call your doctor if you are having problems. It's also a good idea to know your test results and keep a list of the medicines you take. When should you call for help? Share this information with your partner, family, or a friend. They can help you watch for warning signs. Call 911  anytime you think you may need emergency care. For example, call if:    · You have thoughts of harming yourself, your baby, or another person.     · You passed out (lost consciousness).     · You have chest pain, are short of breath, or cough up blood.     · You have a seizure.    Call your doctor now or seek immediate medical care if:    · You have loose stitches, or your incision comes open.     · You have signs of hemorrhage (too much bleeding), such as:  ? Heavy vaginal bleeding. This means that you are soaking through one or more pads in an hour. Or you pass blood clots bigger than an egg. ? Feeling dizzy or lightheaded, or you feel like you may faint. ? Feeling so tired or weak that you cannot do your usual activities. ? A fast or irregular heartbeat. ? New or worse belly pain.     · You have symptoms of infection, such as:  ? Increased pain, swelling, warmth, or redness. ? Red streaks leading from the incision. ? Pus draining from the incision. ? A fever. ? Vaginal discharge that smells bad.  ? New or worse belly pain.     · You have symptoms of a blood clot in your leg (called a deep vein thrombosis), such as:  ? Pain in your calf, back of the knee, thigh, or groin. ? Redness and swelling in your leg or groin.     · You have signs of preeclampsia, such as:  ? Sudden swelling of your face, hands, or feet. ? New vision problems (such as dimness, blurring, or seeing spots). ? A severe headache. Watch closely for changes in your health, and be sure to contact your doctor if:    · Your vaginal bleeding isn't decreasing.     · You feel sad, anxious, or hopeless for more than a few days.     · You are having problems with your breasts or breastfeeding. Where can you learn more? Go to http://www.fish.com/  Enter M806 in the search box to learn more about \" Section: What to Expect at Home. \"  Current as of: 2021               Content Version: 13.2  © 7909-4821 [employer]. Care instructions adapted under license by [employer] (which disclaims liability or warranty for this information). If you have questions about a medical condition or this instruction, always ask your healthcare professional. [address] any warranty or liability for your use of this information.

## 2022-05-07 NOTE — DISCHARGE SUMMARY
Obstetrical Discharge Summary     Name: Madhavi Neves MRN: [id number]  SSN: [id number]    YOB: 2000  Age: 24 y.o. Sex: female      Allergies: Sulfa (sulfonamide antibiotics)    Admit Date: 5/3/2022    Discharge Date: 2022     Admitting Physician: Tania Cook MD     Attending Physician:  Julieth Stafford MD     * Admission Diagnoses: IUGR (intrauterine growth restriction) affecting care of mother [O36.5990]    * Discharge Diagnoses:   Information for the patient's :  Cecilia Rivera [[id number]]   Delivery of a 6 lb 13 oz (3.09 kg) male infant via , Low Transverse on 2022 at 3:34 AM  by Tania Cook. Apgars were 8  and 8 . Additional Diagnoses:   Hospital Problems as of 2022 Date Reviewed: 2022          Codes Class Noted - Resolved POA    Antepartum non-reassuring fetal heart rate or rhythm affecting care of mother ICD-10-CM: R28.7176  ICD-9-CM: 659.73  2022 - Present No        COVID-19 affecting pregnancy in first trimester ICD-10-CM: O98.511, U07.1  ICD-9-CM: 647.63, 079.89  2021 - Present Yes    Overview Addendum 1/3/2022 10:40 AM by Marcelle Lauren MD     2021 at [facility]: Covid Day 4: C/o s/s of C19 starting 10/29/21 with + test on 10/31/21. C/o sore throat, HA, congestion, slight cough. Called to discuss Regeneron infusion with pt. Pt undecided. 2021 at [facility]: Pt did not rec'v Regeneron. Recommend monitoring of fetal well-being for remainder of pregnancy.  Growth at 28, 32 wk in OB office.  Dependent upon maternal and fetal status, may require  testing. * (Principal) High-risk pregnancy in third trimester ICD-10-CM: O09.93  ICD-9-CM: V23.9  2021 - Present Yes    Overview Addendum 1/3/2022 10:41 AM by Marcelle Lauren MD     2021 at [facility]: Normal anatomy/echo; CL 2.6 cm, no funneling;  DIANA WNL. 1/3/2022 at [facility]: Cervix 3.6cm without ripening.                    Lab Results Component Value Date/Time    ABO/Rh(D) A POSITIVE 2022 12:15 AM      Immunization History   Administered Date(s) Administered    Tdap 10/15/2011, 2020       * Procedures:   Procedure(s):   SECTION           * Discharge Condition: good    * Hospital Course: Normal hospital course following the delivery. * Disposition: Home    Discharge Medications:   Current Discharge Medication List      START taking these medications    Details   HYDROcodone-acetaminophen (NORCO) 7.5-325 mg per tablet Take 1 Tablet by mouth every eight (8) hours as needed for Pain for up to 14 days. Max Daily Amount: 3 Tablets. Qty: 20 Tablet, Refills: 0  Start date: 2022, End date: 2022    Associated Diagnoses: Delivery of pregnancy by  section      ibuprofen (MOTRIN) 800 mg tablet Take 1 Tablet by mouth every eight (8) hours as needed for Pain. Qty: 60 Tablet, Refills: 0  Start date: 2022      AMBULATORY BREAST PUMP Use as directed  Qty: 1 Pump, Refills: 0  Start date: 2022      ondansetron (ZOFRAN ODT) 8 mg disintegrating tablet Take 1 Tablet by mouth every eight (8) hours as needed for Nausea. Qty: 20 Tablet, Refills: 0  Start date: 2022         CONTINUE these medications which have NOT CHANGED    Details   calcium carbonate (TUMS PO) Take  by mouth. cholecalciferol, vitamin D3, 50 mcg (2,000 unit) tab Take 1 Tablet by mouth daily. Qty: 90 Tablet, Refills: 3      PNV GV.22/BMXMMMS fum/folic ac (PRENATAL PO) Take  by mouth. STOP taking these medications       BABY ASPIRIN PO Comments:   Reason for Stopping:               * Follow-up Care/Patient Instructions:   Activity: No sex for 6 weeks, No driving while on analgesics and No heavy lifting for 6 weeks  Diet: Regular Diet  Wound Care: As directed    Follow-up Information     Follow up With Specialties Details Why Carey Cook MD [facility]   [address]  [address]  [place] [address]  [phone]

## 2022-05-07 NOTE — PROGRESS NOTES
05/06/22 2220   Pain Assessment   Pain Scale 1 Numeric (0 - 10)   Pain Intensity 1 4   Pain Onset 1 While Pumping   Pain Location 1 Abdomen; Incisional   Pain Orientation 1 Anterior; Lower   Pain Description 1 Aching;Cramping; Sore   Pain Intervention(s) 1 Medication (see MAR)   Vital Signs   Level of Consciousness Alert (0)   Pain Screen   Patient Stated Pain Goal 2   POSS Scale   Opioid Sedation Scale 1     PRN Norco 7.5/325mg for pain

## 2022-05-07 NOTE — LACTATION NOTE
This note was copied from a baby's chart. Baby out to room yesterday. Mom pumping at least 30-60 ml. Observed at breast in football on L. Baby fed well. Demonstrated manual lip flange. Encouraged frequent feeding and watch output. Can pump as needed. Storing milk. See progress notes for feeding plan. Paper copy given to mom.

## 2022-05-07 NOTE — PROGRESS NOTES
Pt states that her prescriptions were sent to the wrong pharmacy. Doctor aware. New pharmacy established and placed into computer.

## 2022-05-07 NOTE — PROGRESS NOTES
Received a call that the wrong pharmacy was in the system for her meds. [facility] transferred the zofran and motrin, but would not transfer the narcotic.    [facility].

## 2022-05-07 NOTE — PROGRESS NOTES
Received ANOTHER call that the [facility] will not fill the norco rx b/c there are 2 different sets of directions. I personally called the new [facility]. Asked exactly what they need and did it. The tech said she will delete the other norco rx to their store and said she can call the [place] [facility] to delete that one. I held on the phone until she saw the 3rd rx go through and said they CAN fill that one.

## 2022-05-07 NOTE — LACTATION NOTE
This note was copied from a baby's chart. Individualized Feeding Plan for Breastfeeding   Lactation Services [phone]      As much as possible, hold your baby on your chest so babys bare skin is against your bare skin with a blanket covering babys back, especially 30 minutes before it is time for baby to eat. Watch for early feeding cues such as, licking lips, sucking motions, rooting, hands to mouth. Crying is a late feeding cue. Feed your baby at least 8 times in 24 hours, or more if your baby is showing feeding cues. If baby is sleepy put baby skin to skin and watch for hunger cues. To rouse baby: unwrap, undress, massage hands, feet, & back, change diaper, gently change babys position from lying to sitting. 15-20 minutes on the first breast of active breastfeeding is considered a good feeding. Good, active breastfeeding is when baby is alert, tugging the nipple, their ear may move, and you can hear swallows. Allow baby to finish the first side before changing sides. Sleeping at the breast or only brief, light sucks should not be considered a good, full breastfeed. At each feeding:  __x__1. Do Suck Practice on finger before each feeding until sucking pattern is smooth. Try using index finger. Nail down towards tongue. __x__2. Hand Express for a few minutes prior to latching to help start milk flow. __x__3. Baby needs to NURSE WELL x 15-20 minutes on at least first breast, burp and offer 2nd breast at every feeding. If no sustained latch only attempt at breast for 10 minutes. If baby does not latch on and feed well on at least one side, you should:   __x__4. Double pump for 15 minutes with breast massage and compression. Hand express for an additional 2-3 minutes per side. Pump after each feeding attempt or poor feeding, up to 8 times per day. If you are not putting baby to the breast you need to pump 8 times a day. Pump every 3 hours. __x__5.  Give baby all of the breast milk you obtain using a straight syringe or  curved syringe. If baby does NOT have enough wet and dirty diapers per day, is jaundiced/lethargic, or has significant weight loss AND you do NOT pump enough milk for each feeding (per volume listed below), formula supplementation may need to be used. Call lactation department /pediatrician if you have concerns. AVERAGE INTAKES OF COLOSTRUM BY HEALTHY  INFANTS:  Time  Day Intake (ml per feeding)  Based on 8 feedings per day. 1st 24 hrs  1 2-10 ml  24-48 hrs  2 5-15 ml  48-72 hrs  3 15-30 ml (0.5-1 oz)  72-96 hrs  4 30-45 ml (1-1.5oz)                          5-6      45-60 ml (1.5-2oz)                           7          At least 60 ml (2+ oz)    By day 7, baby will need at least 60 ml or 2+ oz at each feeding based on 8 feedings per day & babys weight. (1oz = 30ml). Total milk volume needed in 24 hours by Day 7 is 16-18 oz per day based on baby's birthweight of 6-13. The more often baby eats, the less volume they need per feeding. If baby is eating more often than the minimum of 8 times per day, they may take less per feeding. Comments: Since baby initially received formula supplementation. May want to continue insurance pumping after nursing while milk is coming in. Double pump after most daytime feedings for 10 minutes to help milk come in.  Give additional milk as needed or store. Suggest feed and weigh and wean off additional pumping/supplementing as baby is gaining weight well. If pumping, suggest using olive oil or coconut oil on your nipples before pumping to help reduce the friction. Use feeding plan until follow up with pediatrician. Continue to attempt at the breast for most feeds. Pump every 3 hours if no latch. Give all pumped colostrum/breastmilk at each feeding. OUTPATIENT APPOINTMENT Suggested. Outpatient services are located on the 4th floor at [address] [place] [address].  Check in at the 4th floor registration desk (the same one you used when you came to have your baby).   Call for questions [phone]

## 2022-05-07 NOTE — PROGRESS NOTES
Post-Operative Day Number 3 Progress Note  Danyelle Cortes  [id number]    Patient doing well post-op day 3 from  delivery without significant complaints. Pain controlled on current medication. Voiding without difficulty, normal lochia. Tolerating regular diet. Baby now in the room. Pt would like to go home. Vitals:  Patient Vitals for the past 8 hrs:   BP Temp Pulse Resp SpO2   22 0704 106/63 98.8 °F (37.1 °C) 95 15 97 %     Temp (24hrs), Av.6 °F (37 °C), Min:98.2 °F (36.8 °C), Max:98.9 °F (37.2 °C)      Vital signs stable, afebrile. Exam:  Patient without distress. Abdomen soft, fundus firm at level of umbilicus, nontender. Incision dry and                      clean without erythema. Labs:   Recent Results (from the past 24 hour(s))   HEMOGLOBIN    Collection Time: 22  6:22 AM   Result Value Ref Range    HGB 9.1 (L) 11.7 - 15.4 g/dL       Assessment and Plan:  Patient appears to be having uncomplicated post- course. Continue routine post-op care and maternal education.  hgb dropped a little more. Will ck this am for stability. Pt given instructions to call for persistent heavy bldg.

## 2022-05-31 ENCOUNTER — POSTPARTUM VISIT (OUTPATIENT)
Dept: OBGYN CLINIC | Age: 22
End: 2022-05-31

## 2022-05-31 VITALS
DIASTOLIC BLOOD PRESSURE: 62 MMHG | SYSTOLIC BLOOD PRESSURE: 98 MMHG | BODY MASS INDEX: 21.35 KG/M2 | WEIGHT: 136 LBS | HEIGHT: 67 IN

## 2022-05-31 DIAGNOSIS — Z09 POSTOP CHECK: Primary | ICD-10-CM

## 2022-05-31 PROCEDURE — 99024 POSTOP FOLLOW-UP VISIT: CPT | Performed by: OBSTETRICS & GYNECOLOGY

## 2022-05-31 RX ORDER — ACETAMINOPHEN AND CODEINE PHOSPHATE 120; 12 MG/5ML; MG/5ML
1 SOLUTION ORAL DAILY
Qty: 84 TABLET | Refills: 1 | Status: SHIPPED | OUTPATIENT
Start: 2022-05-31

## 2022-05-31 NOTE — PROGRESS NOTES
Wilmer Rosas presents for postop visit from  about 2 weeks ago. Doing well postoperatively. with a small amount of bleeding. Reports:no fever or chills  Voiding well:yes. Bowel movements OK:yes. Breastfeeding yes    Patient Active Problem List    Diagnosis Date Noted    Antepartum non-reassuring fetal heart rate or rhythm affecting care of mother 2022    Medication exposure during first trimester of pregnancy 2021    Cervical shortening in second trimester 2021    Polycystic ovary affecting pregnancy, antepartum 2021    History of prior pregnancy with short cervix, currently pregnant in second trimester 2021    H/O poor fetal growth 2021    COVID-19 affecting pregnancy in first trimester 2021    High-risk pregnancy in third trimester 2021        Exam: Blood pressure 98/62, height 5' 7\" (1.702 m), weight 136 lb (61.7 kg), currently breastfeeding. A&OX3, NAD. Abdomen:  Non tender Incision clean, dry, intact      A/P. Stable Post op condition. Gradually increase activity. Resumption of sexual activity is not encouraged at this time. Discussed contraception. Follow up  3 weeks.

## 2025-03-04 ENCOUNTER — TELEPHONE (OUTPATIENT)
Dept: OBGYN CLINIC | Age: 25
End: 2025-03-04

## 2025-03-04 NOTE — TELEPHONE ENCOUNTER
Pt called the office and wanted  to know if she had ever had hsv blood work drawn at your office informed pt that there ar no record of that being drawn at our office.

## (undated) DEVICE — APPLICATOR BNDG 1MM ADH PREMIERPRO EXOFIN

## (undated) DEVICE — SUTURE MCRYL SZ 4-0 L27IN ABSRB UD L19MM PS-2 1/2 CIR PRIM Y426H

## (undated) DEVICE — PENCIL ES L3M BTTN SWCH S STL HEX LOK BLDE ELECTRD HOLSTER

## (undated) DEVICE — SURGICAL PROCEDURE PACK C SECT CDS

## (undated) DEVICE — CATH FOL TY IC BAG 16FR 2000ML -- CONVERT TO ITEM 363158

## (undated) DEVICE — MEDI-VAC NON-CONDUCTIVE SUCTION TUBING: Brand: CARDINAL HEALTH

## (undated) DEVICE — SUTURE VCRL SZ 2-0 L36IN ABSRB VLT L36MM CT-1 1/2 CIR J345H

## (undated) DEVICE — SUTURE PLN GUT SZ 2-0 L27IN ABSRB YELLOWISH TAN L40MM CT 853H

## (undated) DEVICE — SOLUTION IV 1000ML 0.9% SOD CHL

## (undated) DEVICE — SUT CHRMC 1 36IN CTX BRN --

## (undated) DEVICE — STERILE POLYISOPRENE POWDER-FREE SURGICAL GLOVES: Brand: PROTEXIS

## (undated) DEVICE — STAPLER SKIN SQ 30 ABSRB STPL DISP INSORB

## (undated) DEVICE — KENDALL SCD EXPRESS SLEEVES, KNEE LENGTH, MEDIUM: Brand: KENDALL SCD

## (undated) DEVICE — SOLUTION IRRIG 1000ML H2O STRL BLT

## (undated) DEVICE — SUTURE VCRL SZ 1 L36IN ABSRB UD L36MM CTB-1 1/2 CIR BLNT JB947

## (undated) DEVICE — SUT CHRMC 0 27IN CT1 BRN --

## (undated) DEVICE — REM POLYHESIVE ADULT PATIENT RETURN ELECTRODE: Brand: VALLEYLAB

## (undated) DEVICE — (D)PREP SKN CHLRAPRP APPL 26ML -- CONVERT TO ITEM 371833